# Patient Record
Sex: FEMALE | Race: WHITE | NOT HISPANIC OR LATINO
[De-identification: names, ages, dates, MRNs, and addresses within clinical notes are randomized per-mention and may not be internally consistent; named-entity substitution may affect disease eponyms.]

---

## 2017-03-28 ENCOUNTER — OTHER (OUTPATIENT)
Age: 57
End: 2017-03-28

## 2017-05-10 ENCOUNTER — LABORATORY RESULT (OUTPATIENT)
Age: 57
End: 2017-05-10

## 2017-05-10 ENCOUNTER — APPOINTMENT (OUTPATIENT)
Dept: HEMATOLOGY ONCOLOGY | Facility: CLINIC | Age: 57
End: 2017-05-10

## 2017-05-10 VITALS
BODY MASS INDEX: 29.81 KG/M2 | HEIGHT: 62 IN | SYSTOLIC BLOOD PRESSURE: 146 MMHG | HEART RATE: 63 BPM | DIASTOLIC BLOOD PRESSURE: 83 MMHG | TEMPERATURE: 98.3 F | WEIGHT: 162 LBS

## 2017-05-10 LAB
ALBUMIN SERPL ELPH-MCNC: 4.2 G/DL
ALP BLD-CCNC: 43 U/L
ALT SERPL-CCNC: 6 U/L
ANION GAP SERPL CALC-SCNC: 18 MMOL/L
AST SERPL-CCNC: 11 U/L
BILIRUB SERPL-MCNC: 0.2 MG/DL
BUN SERPL-MCNC: 19 MG/DL
CALCIUM SERPL-MCNC: 9.8 MG/DL
CHLORIDE SERPL-SCNC: 104 MMOL/L
CO2 SERPL-SCNC: 21 MMOL/L
CREAT SERPL-MCNC: 0.83 MG/DL
GLUCOSE SERPL-MCNC: 93 MG/DL
HCT VFR BLD CALC: 36.2 %
HGB BLD-MCNC: 11.7 G/DL
MCHC RBC-ENTMCNC: 28.4 PG
MCHC RBC-ENTMCNC: 32.2 GM/DL
MCV RBC AUTO: 88.3 FL
PLATELET # BLD AUTO: 156 K/UL
POTASSIUM SERPL-SCNC: 4.4 MMOL/L
PROT SERPL-MCNC: 6.3 G/DL
RBC # BLD: 4.1 M/UL
RBC # FLD: 13.4 %
SODIUM SERPL-SCNC: 143 MMOL/L
WBC # FLD AUTO: 6.1 K/UL

## 2017-05-11 LAB — CANCER AG27-29 SERPL-ACNC: 14.9 U/ML

## 2017-05-17 ENCOUNTER — APPOINTMENT (OUTPATIENT)
Dept: HEMATOLOGY ONCOLOGY | Facility: CLINIC | Age: 57
End: 2017-05-17

## 2017-05-17 VITALS
DIASTOLIC BLOOD PRESSURE: 84 MMHG | SYSTOLIC BLOOD PRESSURE: 133 MMHG | HEIGHT: 62 IN | BODY MASS INDEX: 29.81 KG/M2 | WEIGHT: 162 LBS | TEMPERATURE: 98.4 F | HEART RATE: 66 BPM

## 2017-06-01 ENCOUNTER — FORM ENCOUNTER (OUTPATIENT)
Age: 57
End: 2017-06-01

## 2017-06-02 ENCOUNTER — APPOINTMENT (OUTPATIENT)
Dept: MRI IMAGING | Facility: CLINIC | Age: 57
End: 2017-06-02

## 2017-06-02 ENCOUNTER — OUTPATIENT (OUTPATIENT)
Dept: OUTPATIENT SERVICES | Facility: HOSPITAL | Age: 57
LOS: 1 days | End: 2017-06-02
Payer: COMMERCIAL

## 2017-06-02 DIAGNOSIS — Z00.8 ENCOUNTER FOR OTHER GENERAL EXAMINATION: ICD-10-CM

## 2017-06-02 PROCEDURE — C8908: CPT

## 2017-06-02 PROCEDURE — A9585: CPT

## 2017-06-02 PROCEDURE — C8937: CPT

## 2017-06-05 ENCOUNTER — APPOINTMENT (OUTPATIENT)
Dept: MRI IMAGING | Facility: CLINIC | Age: 57
End: 2017-06-05

## 2017-06-20 ENCOUNTER — APPOINTMENT (OUTPATIENT)
Dept: BREAST CENTER | Facility: CLINIC | Age: 57
End: 2017-06-20

## 2017-06-20 VITALS
DIASTOLIC BLOOD PRESSURE: 74 MMHG | HEIGHT: 62 IN | WEIGHT: 156 LBS | SYSTOLIC BLOOD PRESSURE: 110 MMHG | BODY MASS INDEX: 28.71 KG/M2 | HEART RATE: 70 BPM

## 2017-11-14 ENCOUNTER — LABORATORY RESULT (OUTPATIENT)
Age: 57
End: 2017-11-14

## 2017-11-14 ENCOUNTER — APPOINTMENT (OUTPATIENT)
Dept: HEMATOLOGY ONCOLOGY | Facility: CLINIC | Age: 57
End: 2017-11-14
Payer: COMMERCIAL

## 2017-11-14 VITALS — TEMPERATURE: 98.4 F | HEART RATE: 63 BPM | DIASTOLIC BLOOD PRESSURE: 81 MMHG | SYSTOLIC BLOOD PRESSURE: 129 MMHG

## 2017-11-14 LAB
HCT VFR BLD CALC: 38 %
HGB BLD-MCNC: 12.2 G/DL
MCHC RBC-ENTMCNC: 28.7 PG
MCHC RBC-ENTMCNC: 32 GM/DL
MCV RBC AUTO: 89.8 FL
PLATELET # BLD AUTO: 175 K/UL
RBC # BLD: 4.23 M/UL
RBC # FLD: 12.6 %
WBC # FLD AUTO: 4.8 K/UL

## 2017-11-14 PROCEDURE — 85025 COMPLETE CBC W/AUTO DIFF WBC: CPT

## 2017-11-14 PROCEDURE — 36415 COLL VENOUS BLD VENIPUNCTURE: CPT

## 2017-11-15 LAB
25(OH)D3 SERPL-MCNC: 38.6 NG/ML
ALBUMIN SERPL ELPH-MCNC: 4 G/DL
ALP BLD-CCNC: 44 U/L
ALT SERPL-CCNC: 9 U/L
ANION GAP SERPL CALC-SCNC: 15 MMOL/L
AST SERPL-CCNC: 13 U/L
BILIRUB SERPL-MCNC: <0.2 MG/DL
BUN SERPL-MCNC: 14 MG/DL
CALCIUM SERPL-MCNC: 9.8 MG/DL
CHLORIDE SERPL-SCNC: 105 MMOL/L
CO2 SERPL-SCNC: 24 MMOL/L
CREAT SERPL-MCNC: 0.78 MG/DL
GLUCOSE SERPL-MCNC: 90 MG/DL
POTASSIUM SERPL-SCNC: 4.4 MMOL/L
PROT SERPL-MCNC: 6.6 G/DL
SODIUM SERPL-SCNC: 144 MMOL/L

## 2017-11-27 ENCOUNTER — APPOINTMENT (OUTPATIENT)
Dept: HEMATOLOGY ONCOLOGY | Facility: CLINIC | Age: 57
End: 2017-11-27
Payer: COMMERCIAL

## 2017-11-27 VITALS
SYSTOLIC BLOOD PRESSURE: 133 MMHG | HEART RATE: 74 BPM | DIASTOLIC BLOOD PRESSURE: 84 MMHG | HEIGHT: 62 IN | WEIGHT: 168.13 LBS | TEMPERATURE: 98.5 F | BODY MASS INDEX: 30.94 KG/M2

## 2017-11-27 DIAGNOSIS — M35.00 SICCA SYNDROME, UNSPECIFIED: ICD-10-CM

## 2017-11-27 PROCEDURE — 99213 OFFICE O/P EST LOW 20 MIN: CPT

## 2017-12-13 ENCOUNTER — RECORD ABSTRACTING (OUTPATIENT)
Age: 57
End: 2017-12-13

## 2017-12-13 DIAGNOSIS — K27.9 PEPTIC ULCER, SITE UNSPECIFIED, UNSPECIFIED AS ACUTE OR CHRONIC, W/OUT HEMORRHAGE OR PERFORATION: ICD-10-CM

## 2017-12-26 ENCOUNTER — FORM ENCOUNTER (OUTPATIENT)
Age: 57
End: 2017-12-26

## 2017-12-27 ENCOUNTER — OUTPATIENT (OUTPATIENT)
Dept: OUTPATIENT SERVICES | Facility: HOSPITAL | Age: 57
LOS: 1 days | End: 2017-12-27
Payer: COMMERCIAL

## 2017-12-27 ENCOUNTER — APPOINTMENT (OUTPATIENT)
Dept: MAMMOGRAPHY | Facility: CLINIC | Age: 57
End: 2017-12-27
Payer: COMMERCIAL

## 2017-12-27 ENCOUNTER — APPOINTMENT (OUTPATIENT)
Dept: ULTRASOUND IMAGING | Facility: CLINIC | Age: 57
End: 2017-12-27
Payer: COMMERCIAL

## 2017-12-27 DIAGNOSIS — Z00.8 ENCOUNTER FOR OTHER GENERAL EXAMINATION: ICD-10-CM

## 2017-12-27 PROCEDURE — 76641 ULTRASOUND BREAST COMPLETE: CPT | Mod: 26,50

## 2017-12-27 PROCEDURE — 77066 DX MAMMO INCL CAD BI: CPT

## 2017-12-27 PROCEDURE — G0204: CPT | Mod: 26

## 2017-12-27 PROCEDURE — G0279: CPT | Mod: 26

## 2017-12-27 PROCEDURE — G0279: CPT

## 2017-12-27 PROCEDURE — 76641 ULTRASOUND BREAST COMPLETE: CPT

## 2018-01-11 ENCOUNTER — FORM ENCOUNTER (OUTPATIENT)
Age: 58
End: 2018-01-11

## 2018-01-12 ENCOUNTER — OUTPATIENT (OUTPATIENT)
Dept: OUTPATIENT SERVICES | Facility: HOSPITAL | Age: 58
LOS: 1 days | End: 2018-01-12
Payer: COMMERCIAL

## 2018-01-12 ENCOUNTER — RESULT REVIEW (OUTPATIENT)
Age: 58
End: 2018-01-12

## 2018-01-12 ENCOUNTER — APPOINTMENT (OUTPATIENT)
Dept: MAMMOGRAPHY | Facility: CLINIC | Age: 58
End: 2018-01-12
Payer: COMMERCIAL

## 2018-01-12 DIAGNOSIS — Z00.8 ENCOUNTER FOR OTHER GENERAL EXAMINATION: ICD-10-CM

## 2018-01-12 PROCEDURE — 77065 DX MAMMO INCL CAD UNI: CPT

## 2018-01-12 PROCEDURE — 88342 IMHCHEM/IMCYTCHM 1ST ANTB: CPT | Mod: 26

## 2018-01-12 PROCEDURE — 88305 TISSUE EXAM BY PATHOLOGIST: CPT | Mod: 26

## 2018-01-12 PROCEDURE — 77065 DX MAMMO INCL CAD UNI: CPT | Mod: 26,LT

## 2018-01-12 PROCEDURE — 19081 BX BREAST 1ST LESION STRTCTC: CPT | Mod: LT

## 2018-01-12 PROCEDURE — A4648: CPT

## 2018-01-12 PROCEDURE — 19081 BX BREAST 1ST LESION STRTCTC: CPT

## 2018-01-23 ENCOUNTER — APPOINTMENT (OUTPATIENT)
Dept: BREAST CENTER | Facility: CLINIC | Age: 58
End: 2018-01-23
Payer: COMMERCIAL

## 2018-01-23 VITALS
BODY MASS INDEX: 30.91 KG/M2 | WEIGHT: 168 LBS | DIASTOLIC BLOOD PRESSURE: 76 MMHG | SYSTOLIC BLOOD PRESSURE: 110 MMHG | HEART RATE: 80 BPM | HEIGHT: 62 IN

## 2018-01-23 PROCEDURE — 99214 OFFICE O/P EST MOD 30 MIN: CPT

## 2018-03-03 ENCOUNTER — APPOINTMENT (OUTPATIENT)
Dept: MRI IMAGING | Facility: CLINIC | Age: 58
End: 2018-03-03
Payer: COMMERCIAL

## 2018-03-03 ENCOUNTER — OUTPATIENT (OUTPATIENT)
Dept: OUTPATIENT SERVICES | Facility: HOSPITAL | Age: 58
LOS: 1 days | End: 2018-03-03
Payer: COMMERCIAL

## 2018-03-03 DIAGNOSIS — Z00.8 ENCOUNTER FOR OTHER GENERAL EXAMINATION: ICD-10-CM

## 2018-03-03 PROCEDURE — 73718 MRI LOWER EXTREMITY W/O DYE: CPT

## 2018-03-03 PROCEDURE — 73718 MRI LOWER EXTREMITY W/O DYE: CPT | Mod: 26,RT

## 2018-05-15 ENCOUNTER — APPOINTMENT (OUTPATIENT)
Dept: HEMATOLOGY ONCOLOGY | Facility: CLINIC | Age: 58
End: 2018-05-15
Payer: COMMERCIAL

## 2018-05-15 ENCOUNTER — LABORATORY RESULT (OUTPATIENT)
Age: 58
End: 2018-05-15

## 2018-05-15 VITALS
HEART RATE: 71 BPM | TEMPERATURE: 98.9 F | HEIGHT: 62 IN | DIASTOLIC BLOOD PRESSURE: 81 MMHG | WEIGHT: 171.5 LBS | SYSTOLIC BLOOD PRESSURE: 135 MMHG | BODY MASS INDEX: 31.56 KG/M2

## 2018-05-15 LAB
HCT VFR BLD CALC: 37.7 %
HGB BLD-MCNC: 12.6 G/DL
MCHC RBC-ENTMCNC: 29.1 PG
MCHC RBC-ENTMCNC: 33.3 GM/DL
MCV RBC AUTO: 87.2 FL
PLATELET # BLD AUTO: 172 K/UL
RBC # BLD: 4.33 M/UL
RBC # FLD: 12.7 %
WBC # FLD AUTO: 5.2 K/UL

## 2018-05-15 PROCEDURE — 85025 COMPLETE CBC W/AUTO DIFF WBC: CPT

## 2018-05-15 PROCEDURE — 36415 COLL VENOUS BLD VENIPUNCTURE: CPT

## 2018-05-16 LAB
ALBUMIN SERPL ELPH-MCNC: 4.2 G/DL
ALP BLD-CCNC: 51 U/L
ALT SERPL-CCNC: 8 U/L
ANION GAP SERPL CALC-SCNC: 12 MMOL/L
AST SERPL-CCNC: 13 U/L
BILIRUB SERPL-MCNC: 0.2 MG/DL
BUN SERPL-MCNC: 20 MG/DL
CALCIUM SERPL-MCNC: 9.9 MG/DL
CANCER AG27-29 SERPL-ACNC: 15 U/ML
CHLORIDE SERPL-SCNC: 108 MMOL/L
CO2 SERPL-SCNC: 24 MMOL/L
CREAT SERPL-MCNC: 0.81 MG/DL
GLUCOSE SERPL-MCNC: 94 MG/DL
POTASSIUM SERPL-SCNC: 4.2 MMOL/L
PROT SERPL-MCNC: 6.6 G/DL
SODIUM SERPL-SCNC: 144 MMOL/L

## 2018-05-19 PROBLEM — Z83.71 FAMILY HISTORY OF COLONIC POLYPS: Status: ACTIVE | Noted: 2017-12-13

## 2018-05-22 ENCOUNTER — LABORATORY RESULT (OUTPATIENT)
Age: 58
End: 2018-05-22

## 2018-05-22 ENCOUNTER — APPOINTMENT (OUTPATIENT)
Dept: HEMATOLOGY ONCOLOGY | Facility: CLINIC | Age: 58
End: 2018-05-22
Payer: COMMERCIAL

## 2018-05-22 ENCOUNTER — APPOINTMENT (OUTPATIENT)
Dept: GASTROENTEROLOGY | Facility: CLINIC | Age: 58
End: 2018-05-22
Payer: COMMERCIAL

## 2018-05-22 VITALS
WEIGHT: 171.13 LBS | SYSTOLIC BLOOD PRESSURE: 133 MMHG | BODY MASS INDEX: 31.49 KG/M2 | HEART RATE: 66 BPM | HEIGHT: 62 IN | TEMPERATURE: 98.4 F | DIASTOLIC BLOOD PRESSURE: 85 MMHG

## 2018-05-22 VITALS
HEART RATE: 63 BPM | BODY MASS INDEX: 30.91 KG/M2 | HEIGHT: 62 IN | WEIGHT: 168 LBS | DIASTOLIC BLOOD PRESSURE: 89 MMHG | SYSTOLIC BLOOD PRESSURE: 129 MMHG

## 2018-05-22 DIAGNOSIS — Z83.71 FAMILY HISTORY OF COLONIC POLYPS: ICD-10-CM

## 2018-05-22 DIAGNOSIS — Z12.11 ENCOUNTER FOR SCREENING FOR MALIGNANT NEOPLASM OF COLON: ICD-10-CM

## 2018-05-22 LAB
ALBUMIN SERPL ELPH-MCNC: 4.2 G/DL
ALP BLD-CCNC: 56 U/L
ALT SERPL-CCNC: 7 U/L
ANION GAP SERPL CALC-SCNC: 13 MMOL/L
AST SERPL-CCNC: 12 U/L
BILIRUB SERPL-MCNC: 0.2 MG/DL
BUN SERPL-MCNC: 15 MG/DL
CALCIUM SERPL-MCNC: 10.1 MG/DL
CHLORIDE SERPL-SCNC: 105 MMOL/L
CO2 SERPL-SCNC: 25 MMOL/L
CREAT SERPL-MCNC: 0.76 MG/DL
GLUCOSE SERPL-MCNC: 99 MG/DL
HCT VFR BLD CALC: 38.3 %
HGB BLD-MCNC: 12.3 G/DL
MCHC RBC-ENTMCNC: 27.8 PG
MCHC RBC-ENTMCNC: 32.1 GM/DL
MCV RBC AUTO: 86.8 FL
PLATELET # BLD AUTO: 171 K/UL
POTASSIUM SERPL-SCNC: 4.2 MMOL/L
PROT SERPL-MCNC: 6.7 G/DL
RBC # BLD: 4.41 M/UL
RBC # FLD: 12.9 %
SODIUM SERPL-SCNC: 143 MMOL/L
WBC # FLD AUTO: 4.8 K/UL

## 2018-05-22 PROCEDURE — 99214 OFFICE O/P EST MOD 30 MIN: CPT

## 2018-05-22 PROCEDURE — 82274 ASSAY TEST FOR BLOOD FECAL: CPT | Mod: QW

## 2018-05-22 PROCEDURE — 36415 COLL VENOUS BLD VENIPUNCTURE: CPT

## 2018-05-22 PROCEDURE — 85025 COMPLETE CBC W/AUTO DIFF WBC: CPT

## 2018-05-22 PROCEDURE — 99214 OFFICE O/P EST MOD 30 MIN: CPT | Mod: 25

## 2018-05-22 RX ORDER — AMOXICILLIN 875 MG/1
875 TABLET, FILM COATED ORAL
Qty: 20 | Refills: 0 | Status: DISCONTINUED | COMMUNITY
Start: 2018-01-11

## 2018-05-22 NOTE — HISTORY OF PRESENT ILLNESS
[FreeTextEntry1] : Jessica returned to the office today for a routine visit. She has had no change to her bowel habits or abdominal pain. She had blood in her stool for one day, and she was straining at stool prior to see blood. She assumed that this was coming from her hemorrhoids. She is due for a follow-up colonoscopy. In December 2013, she had a left breast lumpectomy followed by radiation. She was diagnosed as stage II A ER+, HER+, and she is on Tamoxifen.

## 2018-05-22 NOTE — CONSULT LETTER
[Dear  ___] : Dear  [unfilled], [Consult Letter:] : I had the pleasure of evaluating your patient, [unfilled]. [( Thank you for referring [unfilled] for consultation for _____ )] : Thank you for referring [unfilled] for consultation for [unfilled] [Please see my note below.] : Please see my note below. [Consult Closing:] : Thank you very much for allowing me to participate in the care of this patient.  If you have any questions, please do not hesitate to contact me. [Sincerely,] : Sincerely, [FreeTextEntry3] : Syed Barlow MD

## 2018-05-22 NOTE — PHYSICAL EXAM
[General Appearance - Alert] : alert [General Appearance - In No Acute Distress] : in no acute distress [General Appearance - Well Nourished] : well nourished [General Appearance - Well Developed] : well developed [General Appearance - Well-Appearing] : healthy appearing [Sclera] : the sclera and conjunctiva were normal [PERRL With Normal Accommodation] : pupils were equal in size, round, and reactive to light [Extraocular Movements] : extraocular movements were intact [Strabismus] : no strabismus was seen [Outer Ear] : the ears and nose were normal in appearance [Examination Of The Oral Cavity] : the lips and gums were normal [Both Tympanic Membranes Were Examined] : both tympanic membranes were normal [Nasal Cavity] : the nasal mucosa and septum were normal [Oropharynx] : the oropharynx was normal [Neck Appearance] : the appearance of the neck was normal [Neck Cervical Mass (___cm)] : no neck mass was observed [Jugular Venous Distention Increased] : there was no jugular-venous distention [Thyroid Diffuse Enlargement] : the thyroid was not enlarged [Thyroid Nodule] : there were no palpable thyroid nodules [Auscultation Breath Sounds / Voice Sounds] : lungs were clear to auscultation bilaterally [Lungs Percussion] : the lungs were normal to percussion [Heart Rate And Rhythm] : heart rate was normal and rhythm regular [Heart Sounds] : normal S1 and S2 [Heart Sounds Gallop] : no gallops [Murmurs] : no murmurs [Heart Sounds Pericardial Friction Rub] : no pericardial rub [Arterial Pulses Carotid] : carotid pulses were normal with no bruits [Abdominal Aorta] : the abdominal aorta was normal [Full Pulse] : the pedal pulses are present [Edema] : there was no peripheral edema [Veins - Varicosity Changes] : there were no varicosital changes [Bowel Sounds] : normal bowel sounds [Abdomen Soft] : soft [Abdomen Tenderness] : non-tender [Abdomen Mass (___ Cm)] : no abdominal mass palpated [Abdomen Hernia] : no hernia was discovered [Normal Sphincter Tone] : normal sphincter tone [No Rectal Mass] : no rectal mass [Occult Blood Positive] : stool was negative for occult blood [FreeTextEntry1] : Brown stool, Hemoccult negative, iFOBT negative [Cervical Lymph Nodes Enlarged Posterior Bilaterally] : posterior cervical [Cervical Lymph Nodes Enlarged Anterior Bilaterally] : anterior cervical [Supraclavicular Lymph Nodes Enlarged Bilaterally] : supraclavicular [Axillary Lymph Nodes Enlarged Bilaterally] : axillary [Femoral Lymph Nodes Enlarged Bilaterally] : femoral [Inguinal Lymph Nodes Enlarged Bilaterally] : inguinal [No CVA Tenderness] : no ~M costovertebral angle tenderness [No Spinal Tenderness] : no spinal tenderness [Abnormal Walk] : normal gait [Nail Clubbing] : no clubbing  or cyanosis of the fingernails [Involuntary Movements] : no involuntary movements were seen [Musculoskeletal - Swelling] : no joint swelling seen [Skin Color & Pigmentation] : normal skin color and pigmentation [Skin Turgor] : normal skin turgor [] : no rash [No Focal Deficits] : no focal deficits [Oriented To Time, Place, And Person] : oriented to person, place, and time [Impaired Insight] : insight and judgment were intact [Affect] : the affect was normal [Mood] : the mood was normal

## 2018-05-22 NOTE — ASSESSMENT
[FreeTextEntry1] : 1. Average risk for colorectal cancer-rule out colonic polyps.\par 2. Status post left breast lumpectomy for stage II A breast cancer.\par 3. History of peptic ulcer disease.\par 4. Status post C-sections.\par \par The patient was advised to schedule a colonoscopy-procedure, material risks, benefits and alternatives were discussed with the patient at length. Brochure given.  MiraLax prep.

## 2018-05-22 NOTE — REVIEW OF SYSTEMS
[Recent Weight Gain (___ Lbs)] : recent [unfilled] ~Ulb weight gain [Joint Pain] : joint pain [Joint Stiffness] : joint stiffness [Negative] : Heme/Lymph

## 2018-05-22 NOTE — REASON FOR VISIT
[Follow-Up: _____] : a [unfilled] follow-up visit [FreeTextEntry1] : Pre-colonoscopic exam.  Pt's last CO was 5 years ago, which showed hemorrhoids.  She currently has no complaints.

## 2018-05-24 LAB — CANCER AG27-29 SERPL-ACNC: 14.2 U/ML

## 2018-06-11 ENCOUNTER — APPOINTMENT (OUTPATIENT)
Dept: GASTROENTEROLOGY | Facility: CLINIC | Age: 58
End: 2018-06-11
Payer: COMMERCIAL

## 2018-06-11 PROCEDURE — G0121 COLON CA SCRN NOT HI RSK IND: CPT

## 2018-07-29 ENCOUNTER — FORM ENCOUNTER (OUTPATIENT)
Age: 58
End: 2018-07-29

## 2018-07-30 ENCOUNTER — OUTPATIENT (OUTPATIENT)
Dept: OUTPATIENT SERVICES | Facility: HOSPITAL | Age: 58
LOS: 1 days | End: 2018-07-30
Payer: COMMERCIAL

## 2018-07-30 ENCOUNTER — APPOINTMENT (OUTPATIENT)
Dept: MAMMOGRAPHY | Facility: CLINIC | Age: 58
End: 2018-07-30
Payer: COMMERCIAL

## 2018-07-30 DIAGNOSIS — Z00.8 ENCOUNTER FOR OTHER GENERAL EXAMINATION: ICD-10-CM

## 2018-07-30 PROCEDURE — 77065 DX MAMMO INCL CAD UNI: CPT

## 2018-07-30 PROCEDURE — G0279: CPT

## 2018-07-30 PROCEDURE — 77065 DX MAMMO INCL CAD UNI: CPT | Mod: 26,LT

## 2018-07-30 PROCEDURE — G0279: CPT | Mod: 26

## 2018-09-25 ENCOUNTER — APPOINTMENT (OUTPATIENT)
Dept: BREAST CENTER | Facility: CLINIC | Age: 58
End: 2018-09-25
Payer: COMMERCIAL

## 2018-09-25 VITALS
WEIGHT: 170 LBS | BODY MASS INDEX: 31.28 KG/M2 | HEIGHT: 62 IN | DIASTOLIC BLOOD PRESSURE: 82 MMHG | SYSTOLIC BLOOD PRESSURE: 138 MMHG

## 2018-09-25 PROCEDURE — 99213 OFFICE O/P EST LOW 20 MIN: CPT

## 2018-11-20 ENCOUNTER — LABORATORY RESULT (OUTPATIENT)
Age: 58
End: 2018-11-20

## 2018-11-20 ENCOUNTER — APPOINTMENT (OUTPATIENT)
Dept: HEMATOLOGY ONCOLOGY | Facility: CLINIC | Age: 58
End: 2018-11-20
Payer: COMMERCIAL

## 2018-11-20 VITALS
HEIGHT: 62 IN | DIASTOLIC BLOOD PRESSURE: 88 MMHG | SYSTOLIC BLOOD PRESSURE: 150 MMHG | BODY MASS INDEX: 32.09 KG/M2 | WEIGHT: 174.38 LBS | HEART RATE: 64 BPM | TEMPERATURE: 98.5 F

## 2018-11-20 LAB
ALBUMIN SERPL ELPH-MCNC: 4.4 G/DL
ALP BLD-CCNC: 55 U/L
ALT SERPL-CCNC: 11 U/L
ANION GAP SERPL CALC-SCNC: 10 MMOL/L
AST SERPL-CCNC: 13 U/L
BILIRUB SERPL-MCNC: 0.2 MG/DL
BUN SERPL-MCNC: 18 MG/DL
CALCIUM SERPL-MCNC: 10.2 MG/DL
CHLORIDE SERPL-SCNC: 104 MMOL/L
CO2 SERPL-SCNC: 28 MMOL/L
CREAT SERPL-MCNC: 0.77 MG/DL
GLUCOSE SERPL-MCNC: 94 MG/DL
HCT VFR BLD CALC: 39.3 %
HGB BLD-MCNC: 12.5 G/DL
MCHC RBC-ENTMCNC: 28 PG
MCHC RBC-ENTMCNC: 31.8 GM/DL
MCV RBC AUTO: 88.1 FL
PLATELET # BLD AUTO: 176 K/UL
POTASSIUM SERPL-SCNC: 4.2 MMOL/L
PROT SERPL-MCNC: 6.6 G/DL
RBC # BLD: 4.46 M/UL
RBC # FLD: 13.3 %
SODIUM SERPL-SCNC: 142 MMOL/L
WBC # FLD AUTO: 5.4 K/UL

## 2018-11-20 PROCEDURE — 36415 COLL VENOUS BLD VENIPUNCTURE: CPT

## 2018-11-20 PROCEDURE — 85025 COMPLETE CBC W/AUTO DIFF WBC: CPT

## 2018-11-21 LAB — CANCER AG27-29 SERPL-ACNC: 17.5 U/ML

## 2018-11-27 ENCOUNTER — APPOINTMENT (OUTPATIENT)
Dept: HEMATOLOGY ONCOLOGY | Facility: CLINIC | Age: 58
End: 2018-11-27
Payer: COMMERCIAL

## 2018-11-27 VITALS
WEIGHT: 172 LBS | BODY MASS INDEX: 31.65 KG/M2 | DIASTOLIC BLOOD PRESSURE: 86 MMHG | SYSTOLIC BLOOD PRESSURE: 135 MMHG | TEMPERATURE: 98.5 F | HEART RATE: 62 BPM | HEIGHT: 62 IN

## 2018-11-27 DIAGNOSIS — Z51.81 ENCOUNTER FOR THERAPEUTIC DRUG LVL MONITORING: ICD-10-CM

## 2018-11-27 DIAGNOSIS — Z79.899 ENCOUNTER FOR THERAPEUTIC DRUG LVL MONITORING: ICD-10-CM

## 2018-11-27 PROCEDURE — 99214 OFFICE O/P EST MOD 30 MIN: CPT

## 2018-11-27 NOTE — HISTORY OF PRESENT ILLNESS
[T: ___] : T[unfilled] [N: ___] : N[unfilled] [AJCC Stage: ____] : AJCC Stage: [unfilled] [0 - No Distress] : Distress Level: 0 [de-identified] : We are following FAITH TOVAR for an ER+, HER2-, stage IIA breast cancer. \par 8/2014 - Presented with an abnormality on routine mammogram ( there was an irregularity in the same region of a previous stereotactic biopsy in the UOQ of the left breast) at age 54.  \par 11/5/14 -  core biopsy revealed cancer. \par 12/10/14 - left lumpectomy for a 1.8 x 1.6 x 1.0 IDC with focal DCIS and there was lymphovascular invasion present.  1/1  SLN was positive with a 2.5mm micrometastasis.   \par 4/13/15 - Completed XRT.    \par 2/10/15 - Started Tamoxifen (she was still premenopausal). [de-identified] : infiltrating ductal carcinoma [de-identified] :  ER 85%, [place] 95%, HER2 -;  Oncotype score was 15 (low risk) [de-identified] :   Patient is feeling well. Patient has right  heel pain been followed by Rheumatologist. \par \par She denies any other changes in her family, medical, or social history since her last visit of 5/22/18.

## 2018-11-27 NOTE — REVIEW OF SYSTEMS
[Patient Intake Form Reviewed] : Patient intake form was reviewed [Joint Pain] : joint pain [Muscle Weakness] : muscle weakness [Negative] : Allergic/Immunologic [FreeTextEntry2] : Right foot pains as in interval history.  Just taking Aleve PRN. [FreeTextEntry5] : occasional LE edema but mostly only when it is very hot outside [FreeTextEntry9] : right foot, occasional numbness in toes (not neuropathy per neurologist) [de-identified] : LMP 4/1/16

## 2018-11-27 NOTE — PHYSICAL EXAM
[Restricted in physically strenuous activity but ambulatory and able to carry out work of a light or sedentary nature] : Status 1- Restricted in physically strenuous activity but ambulatory and able to carry out work of a light or sedentary nature, e.g., light house work, office work [Normal] : affect appropriate [de-identified] : overweight [de-identified] : anicteric [de-identified] : no lymphadenopathy [de-identified] : S1 S2 RRR, no obvious murmurs [de-identified] : no c/c/e [de-identified] : Bilateral breast and axillas  no masses palpated, no nipple discharge.  [de-identified] : no rashes

## 2018-12-19 ENCOUNTER — RX RENEWAL (OUTPATIENT)
Age: 58
End: 2018-12-19

## 2019-03-05 ENCOUNTER — FORM ENCOUNTER (OUTPATIENT)
Age: 59
End: 2019-03-05

## 2019-03-06 ENCOUNTER — APPOINTMENT (OUTPATIENT)
Dept: MAMMOGRAPHY | Facility: CLINIC | Age: 59
End: 2019-03-06
Payer: COMMERCIAL

## 2019-03-06 ENCOUNTER — APPOINTMENT (OUTPATIENT)
Dept: ULTRASOUND IMAGING | Facility: CLINIC | Age: 59
End: 2019-03-06
Payer: COMMERCIAL

## 2019-03-06 ENCOUNTER — OUTPATIENT (OUTPATIENT)
Dept: OUTPATIENT SERVICES | Facility: HOSPITAL | Age: 59
LOS: 1 days | End: 2019-03-06
Payer: COMMERCIAL

## 2019-03-06 DIAGNOSIS — Z00.8 ENCOUNTER FOR OTHER GENERAL EXAMINATION: ICD-10-CM

## 2019-03-06 PROCEDURE — 77066 DX MAMMO INCL CAD BI: CPT | Mod: 26

## 2019-03-06 PROCEDURE — G0279: CPT | Mod: 26

## 2019-03-06 PROCEDURE — 76641 ULTRASOUND BREAST COMPLETE: CPT | Mod: 26,50

## 2019-03-06 PROCEDURE — 76641 ULTRASOUND BREAST COMPLETE: CPT

## 2019-03-06 PROCEDURE — G0279: CPT

## 2019-03-06 PROCEDURE — 77066 DX MAMMO INCL CAD BI: CPT

## 2019-05-06 NOTE — PAST MEDICAL HISTORY
[Menarche Age ____] : age at menarche was [unfilled] [Definite ___ (Date)] : the last menstrual period was [unfilled] [Approximately ___] : the LMP was approximately [unfilled] [Total Preg ___] : G[unfilled] [Live Births ___] : P[unfilled]  [Age At Live Birth ___] : Age at live birth: [unfilled]

## 2019-05-07 ENCOUNTER — APPOINTMENT (OUTPATIENT)
Dept: BREAST CENTER | Facility: CLINIC | Age: 59
End: 2019-05-07
Payer: COMMERCIAL

## 2019-05-07 VITALS
HEIGHT: 62 IN | SYSTOLIC BLOOD PRESSURE: 139 MMHG | WEIGHT: 170 LBS | BODY MASS INDEX: 31.28 KG/M2 | DIASTOLIC BLOOD PRESSURE: 82 MMHG | HEART RATE: 57 BPM

## 2019-05-07 PROCEDURE — 99213 OFFICE O/P EST LOW 20 MIN: CPT

## 2019-05-07 NOTE — PHYSICAL EXAM
[Sclera nonicteric] : sclera nonicteric [Conjunctiva pink] : conjunctiva pink [No Supraclavicular Adenopathy] : no supraclavicular adenopathy [Supple] : supple [No Cervical Adenopathy] : no cervical adenopathy [No Thyromegaly] : no thyromegaly [Clear to Auscultation Bilat] : clear to auscultation bilaterally [No Gallops] : no gallops [Normal Sinus Rhythm] : normal sinus rhythm [Examined in the supine and seated position] : examined in the supine and seated position [No Rubs] : no pericardial rub [Symmetrical] : symmetrical [No dominant masses] : no dominant masses in right breast  [No dominant masses] : no dominant masses left breast [No Nipple Retraction] : no left nipple retraction [No Nipple Discharge] : no right nipple discharge [No Axillary Lymphadenopathy] : no left axillary lymphadenopathy [Soft] : abdomen soft [Not Tender] : non-tender [No Hepato-Splenomegaly] : no hepato-splenomegaly [No Swelling] : no swelling [de-identified] : Lumpectomy incision at 12-1:00.  Radiation changes. [de-identified] : axillary incision .

## 2019-05-07 NOTE — DATA REVIEWED
[FreeTextEntry1] :   \par Breast MRI:  06/02/17   Findings: No suspicious findings.\par \par Mammogram/sonogram:  12/27/17    Findings:Dense tissue; distortion left UOQ.  New 4 mm nodular asymmetry medial left breast.   On ultrasound there are no suspicious findings. No correlate to the mammographic finding.\par \par Left stereotactic biopsy:  1/12/18   Pathology:  Leiomyoma left breast.\par \par Left Diagnostic Mammogram:  7/30/18   Findings:  Negative\par \par Bilateral mammogram:  3/6/19   Findings:  No suspicious masses or calcifications.\par Breast ultrasound: 3/6/19   findings:  Stable, biopsy proven benign 5 mm nodule Right breast 1:00 N12.\par Left breast - negative.\par \par

## 2019-05-07 NOTE — HISTORY OF PRESENT ILLNESS
[FreeTextEntry1] : 58 year old female with a history of stage 2A left breast carcinoma presents for a surveillance examination.   She has no complaints.

## 2019-06-20 ENCOUNTER — LABORATORY RESULT (OUTPATIENT)
Age: 59
End: 2019-06-20

## 2019-06-20 ENCOUNTER — APPOINTMENT (OUTPATIENT)
Age: 59
End: 2019-06-20
Payer: COMMERCIAL

## 2019-06-20 VITALS — TEMPERATURE: 98.7 F | DIASTOLIC BLOOD PRESSURE: 82 MMHG | HEART RATE: 68 BPM | SYSTOLIC BLOOD PRESSURE: 121 MMHG

## 2019-06-20 LAB
ALBUMIN SERPL ELPH-MCNC: 4.3 G/DL
ALP BLD-CCNC: 61 U/L
ALT SERPL-CCNC: 8 U/L
ANION GAP SERPL CALC-SCNC: 10 MMOL/L
AST SERPL-CCNC: 13 U/L
BILIRUB SERPL-MCNC: 0.2 MG/DL
BUN SERPL-MCNC: 18 MG/DL
CALCIUM SERPL-MCNC: 9.4 MG/DL
CHLORIDE SERPL-SCNC: 108 MMOL/L
CO2 SERPL-SCNC: 27 MMOL/L
CREAT SERPL-MCNC: 0.79 MG/DL
GLUCOSE SERPL-MCNC: 104 MG/DL
POTASSIUM SERPL-SCNC: 4.1 MMOL/L
PROT SERPL-MCNC: 6.3 G/DL
SODIUM SERPL-SCNC: 145 MMOL/L

## 2019-06-20 PROCEDURE — 36415 COLL VENOUS BLD VENIPUNCTURE: CPT

## 2019-06-20 PROCEDURE — 85025 COMPLETE CBC W/AUTO DIFF WBC: CPT

## 2019-06-21 LAB
25(OH)D3 SERPL-MCNC: 39.4 NG/ML
CANCER AG27-29 SERPL-ACNC: 17 U/ML

## 2019-06-27 ENCOUNTER — APPOINTMENT (OUTPATIENT)
Age: 59
End: 2019-06-27
Payer: COMMERCIAL

## 2019-06-27 VITALS
HEART RATE: 63 BPM | RESPIRATION RATE: 16 BRPM | BODY MASS INDEX: 31.28 KG/M2 | WEIGHT: 170 LBS | DIASTOLIC BLOOD PRESSURE: 79 MMHG | SYSTOLIC BLOOD PRESSURE: 130 MMHG | TEMPERATURE: 98.4 F | HEIGHT: 62 IN

## 2019-06-27 PROCEDURE — 99214 OFFICE O/P EST MOD 30 MIN: CPT

## 2019-06-27 NOTE — PHYSICAL EXAM
[Fully active, able to carry on all pre-disease performance without restriction] : Status 0 - Fully active, able to carry on all pre-disease performance without restriction [Normal] : full range of motion and no deformities appreciated [de-identified] : left breast lumpectomy scar well healed. No masses, no nipple d/c bilaterally.

## 2019-06-27 NOTE — ASSESSMENT
[FreeTextEntry1] : Ms. TOVAR 's questions were answered to her satisfaction. She expressed her understanding and willingness to comply with the above recommendations, and  will return to the office in 6 months.\par

## 2019-06-27 NOTE — HISTORY OF PRESENT ILLNESS
[de-identified] : 58 F with ER+, Her 2 negative, stage II A ( T1c, N1a)  infiltrating ductal carcinoma diagnosed in December 2014.\par \par CASE SYNOPSIS:\par 8/2014 – mammogram at age 54 –abnormality in left breast UOQ, previously biopsied. \par \par 11/5/14 – core biopsy confirms malignancy .\par \par 12/10/14 – left lumpectomy; pathology 1.8 x 1.6 x 1.0 cm infiltrating ductal carcinoma with focal DCIS, LVI+, 1/1 SLN positive with 2.5 mm micrometastasis.\par \par 2/10/15 – started Tamoxifen (patient premenopausal at the time). \par \par 4/13/15 – completed XRT.\par \par 7/20/18 – mammogram/ sonogram: no evidence of disease.\par \par 3/6/19- mammogram/ breast US: no mammographic evidence of malignancy; stable hypoechoic nodule 1:00 right breast (previously biopsied).\par \par 6/11/19- D&C- endometrial hyperplasia; + endometrial polyps x 3; no malignancy\par  [FreeTextEntry1] : Tamoxifen [de-identified] : Patient returning for follow up; last seen in office in November 2018. In interim patient had annual mammogram in March 2019, with no evidence of malignancy. Denies arthritic pain, VTEs; complaining of gaining weight and irritability. Continues on Tamoxifen.Had D&C 6/11/19 consistent with endometrial hyperplasia, no malignancy. Appetite and weight stable.

## 2019-06-28 ENCOUNTER — APPOINTMENT (OUTPATIENT)
Dept: HEMATOLOGY ONCOLOGY | Facility: CLINIC | Age: 59
End: 2019-06-28

## 2019-11-14 ENCOUNTER — APPOINTMENT (OUTPATIENT)
Dept: BREAST CENTER | Facility: CLINIC | Age: 59
End: 2019-11-14
Payer: COMMERCIAL

## 2019-11-14 VITALS
DIASTOLIC BLOOD PRESSURE: 84 MMHG | HEIGHT: 62 IN | HEART RATE: 61 BPM | WEIGHT: 169 LBS | SYSTOLIC BLOOD PRESSURE: 126 MMHG | BODY MASS INDEX: 31.1 KG/M2

## 2019-11-14 DIAGNOSIS — Z80.3 FAMILY HISTORY OF MALIGNANT NEOPLASM OF BREAST: ICD-10-CM

## 2019-11-14 DIAGNOSIS — Z80.49 FAMILY HISTORY OF MALIGNANT NEOPLASM OF OTHER GENITAL ORGANS: ICD-10-CM

## 2019-11-14 PROCEDURE — 99214 OFFICE O/P EST MOD 30 MIN: CPT

## 2019-11-14 NOTE — PHYSICAL EXAM
[Sclera nonicteric] : sclera nonicteric [Conjunctiva pink] : conjunctiva pink [Supple] : supple [No Cervical Adenopathy] : no cervical adenopathy [No Supraclavicular Adenopathy] : no supraclavicular adenopathy [No Thyromegaly] : no thyromegaly [Clear to Auscultation Bilat] : clear to auscultation bilaterally [Normal Sinus Rhythm] : normal sinus rhythm [No Gallops] : no gallops [No Rubs] : no pericardial rub [Examined in the supine and seated position] : examined in the supine and seated position [Symmetrical] : symmetrical [No dominant masses] : no dominant masses in right breast  [No dominant masses] : no dominant masses left breast [No Nipple Retraction] : no left nipple retraction [No Nipple Discharge] : no left nipple discharge [No Axillary Lymphadenopathy] : no left axillary lymphadenopathy [Soft] : abdomen soft [Not Tender] : non-tender [No Hepato-Splenomegaly] : no hepato-splenomegaly [No Swelling] : no swelling [de-identified] : Lumpectomy incision at 12-1:00.  Radiation changes. [de-identified] : axillary incision .

## 2019-11-14 NOTE — HISTORY OF PRESENT ILLNESS
[FreeTextEntry1] : 59 year old female with a history of stage 2A left breast carcinoma presents for a surveillance examination.   She has no complaints.

## 2019-12-10 ENCOUNTER — RX RENEWAL (OUTPATIENT)
Age: 59
End: 2019-12-10

## 2020-01-03 ENCOUNTER — RESULT REVIEW (OUTPATIENT)
Age: 60
End: 2020-01-03

## 2020-01-03 ENCOUNTER — APPOINTMENT (OUTPATIENT)
Age: 60
End: 2020-01-03
Payer: COMMERCIAL

## 2020-01-03 ENCOUNTER — OUTPATIENT (OUTPATIENT)
Dept: OUTPATIENT SERVICES | Facility: HOSPITAL | Age: 60
LOS: 1 days | Discharge: HOME | End: 2020-01-03

## 2020-01-03 DIAGNOSIS — C50.912 MALIGNANT NEOPLASM OF UNSPECIFIED SITE OF LEFT FEMALE BREAST: ICD-10-CM

## 2020-01-03 PROCEDURE — 99499A: CUSTOM | Mod: NC

## 2020-02-13 ENCOUNTER — OUTPATIENT (OUTPATIENT)
Dept: OUTPATIENT SERVICES | Facility: HOSPITAL | Age: 60
LOS: 1 days | Discharge: HOME | End: 2020-02-13

## 2020-02-13 DIAGNOSIS — C50.912 MALIGNANT NEOPLASM OF UNSPECIFIED SITE OF LEFT FEMALE BREAST: ICD-10-CM

## 2020-02-21 ENCOUNTER — APPOINTMENT (OUTPATIENT)
Age: 60
End: 2020-02-21
Payer: COMMERCIAL

## 2020-02-21 VITALS
RESPIRATION RATE: 16 BRPM | HEART RATE: 58 BPM | SYSTOLIC BLOOD PRESSURE: 153 MMHG | HEIGHT: 62 IN | BODY MASS INDEX: 31.28 KG/M2 | WEIGHT: 170 LBS | TEMPERATURE: 98.5 F | DIASTOLIC BLOOD PRESSURE: 91 MMHG

## 2020-02-21 PROCEDURE — 99214 OFFICE O/P EST MOD 30 MIN: CPT

## 2020-02-21 RX ORDER — AMOXICILLIN AND CLAVULANATE POTASSIUM 875; 125 MG/1; MG/1
875-125 TABLET, COATED ORAL
Qty: 20 | Refills: 0 | Status: DISCONTINUED | COMMUNITY
Start: 2020-01-03

## 2020-02-21 RX ORDER — CEFUROXIME AXETIL 250 MG/1
250 TABLET ORAL
Qty: 20 | Refills: 0 | Status: DISCONTINUED | COMMUNITY
Start: 2019-11-14

## 2020-02-21 RX ORDER — CEFDINIR 300 MG/1
300 CAPSULE ORAL
Qty: 14 | Refills: 0 | Status: DISCONTINUED | COMMUNITY
Start: 2020-01-25

## 2020-02-21 NOTE — HISTORY OF PRESENT ILLNESS
[de-identified] : 59 F with ER+, Her 2 negative, stage II A ( T1c, N1a)  infiltrating ductal carcinoma diagnosed in December 2014.\par \par CASE SYNOPSIS:\par 8/2014 – mammogram at age 54 –abnormality in left breast UOQ, previously biopsied. \par \par 11/5/14 – core biopsy confirms malignancy .\par \par 12/10/14 – left lumpectomy; pathology 1.8 x 1.6 x 1.0 cm infiltrating ductal carcinoma with focal DCIS, LVI+, 1/1 SLN positive with 2.5 mm micrometastasis.\par \par 2/10/15 – started Tamoxifen (patient premenopausal at the time). \par \par 4/13/15 – completed XRT.\par \par 7/20/18 – mammogram/ sonogram: no evidence of disease.\par \par 3/6/19- mammogram/ breast US: no mammographic evidence of malignancy; stable hypoechoic nodule 1:00 right breast (previously biopsied).\par \par 6/11/19- D&C- endometrial hyperplasia; + endometrial polyps x 3; no malignancy.\par \par 1/24/20- D&C; fragments of endometrial polyp in a background of weakly proliferative endometrium.\par \par 3/1/20-Plan to discontinue tamoxifen and switched to letrozole 2.5 mg daily. [FreeTextEntry1] : Tamoxifen-to be discontinued 2/29/20\par Letrozole 2.5 mg daily- to start March 1, 2020. [de-identified] : Last seen in office in June 2019. Ms. TOVAR is doing well, complies with adjuvant hormonal treatment ( Tamoxifen). She endorses minimal side effects. Last mammogram (3 / 6/ 19 consistent with no evidence of malignancy; due for repeat mammogram on March 4, 2020. Laboratory tests in normal range.On January 24, 2020 patient had a D&C consistent fragments of endometrial polyp in a background of weakly proliferative endometrium.She remains active, and plans a trip to [place] in the near future.

## 2020-02-21 NOTE — PHYSICAL EXAM
[Fully active, able to carry on all pre-disease performance without restriction] : Status 0 - Fully active, able to carry on all pre-disease performance without restriction [Normal] : full range of motion and no deformities appreciated [de-identified] : left breast lumpectomy scar well healed. No masses, no nipple d/c bilaterally.

## 2020-03-03 ENCOUNTER — FORM ENCOUNTER (OUTPATIENT)
Age: 60
End: 2020-03-03

## 2020-03-04 ENCOUNTER — APPOINTMENT (OUTPATIENT)
Dept: CARDIOLOGY | Facility: CLINIC | Age: 60
End: 2020-03-04
Payer: COMMERCIAL

## 2020-03-04 ENCOUNTER — APPOINTMENT (OUTPATIENT)
Dept: ULTRASOUND IMAGING | Facility: CLINIC | Age: 60
End: 2020-03-04
Payer: COMMERCIAL

## 2020-03-04 ENCOUNTER — OUTPATIENT (OUTPATIENT)
Dept: OUTPATIENT SERVICES | Facility: HOSPITAL | Age: 60
LOS: 1 days | End: 2020-03-04
Payer: COMMERCIAL

## 2020-03-04 ENCOUNTER — APPOINTMENT (OUTPATIENT)
Dept: MAMMOGRAPHY | Facility: CLINIC | Age: 60
End: 2020-03-04
Payer: COMMERCIAL

## 2020-03-04 VITALS
HEART RATE: 67 BPM | RESPIRATION RATE: 14 BRPM | WEIGHT: 170 LBS | DIASTOLIC BLOOD PRESSURE: 83 MMHG | BODY MASS INDEX: 31.28 KG/M2 | HEIGHT: 62 IN | SYSTOLIC BLOOD PRESSURE: 132 MMHG

## 2020-03-04 DIAGNOSIS — Z85.3 PERSONAL HISTORY OF MALIGNANT NEOPLASM OF BREAST: ICD-10-CM

## 2020-03-04 PROCEDURE — 77066 DX MAMMO INCL CAD BI: CPT | Mod: 26

## 2020-03-04 PROCEDURE — 76641 ULTRASOUND BREAST COMPLETE: CPT | Mod: 26,50

## 2020-03-04 PROCEDURE — G0279: CPT | Mod: 26

## 2020-03-04 PROCEDURE — 77063 BREAST TOMOSYNTHESIS BI: CPT

## 2020-03-04 PROCEDURE — 77067 SCR MAMMO BI INCL CAD: CPT

## 2020-03-04 PROCEDURE — 99244 OFF/OP CNSLTJ NEW/EST MOD 40: CPT

## 2020-03-04 PROCEDURE — G0279: CPT

## 2020-03-04 PROCEDURE — 77066 DX MAMMO INCL CAD BI: CPT

## 2020-03-04 PROCEDURE — 93000 ELECTROCARDIOGRAM COMPLETE: CPT

## 2020-03-04 PROCEDURE — 76641 ULTRASOUND BREAST COMPLETE: CPT

## 2020-03-07 ENCOUNTER — APPOINTMENT (OUTPATIENT)
Dept: ULTRASOUND IMAGING | Facility: CLINIC | Age: 60
End: 2020-03-07

## 2020-03-07 ENCOUNTER — APPOINTMENT (OUTPATIENT)
Dept: MAMMOGRAPHY | Facility: CLINIC | Age: 60
End: 2020-03-07

## 2020-04-16 ENCOUNTER — APPOINTMENT (OUTPATIENT)
Dept: CARDIOLOGY | Facility: CLINIC | Age: 60
End: 2020-04-16

## 2020-06-11 ENCOUNTER — APPOINTMENT (OUTPATIENT)
Dept: CARDIOLOGY | Facility: CLINIC | Age: 60
End: 2020-06-11

## 2020-08-31 ENCOUNTER — OUTPATIENT (OUTPATIENT)
Dept: OUTPATIENT SERVICES | Facility: HOSPITAL | Age: 60
LOS: 1 days | Discharge: HOME | End: 2020-08-31

## 2020-08-31 DIAGNOSIS — C50.912 MALIGNANT NEOPLASM OF UNSPECIFIED SITE OF LEFT FEMALE BREAST: ICD-10-CM

## 2020-09-08 ENCOUNTER — RESULT REVIEW (OUTPATIENT)
Age: 60
End: 2020-09-08

## 2020-09-08 ENCOUNTER — APPOINTMENT (OUTPATIENT)
Age: 60
End: 2020-09-08
Payer: COMMERCIAL

## 2020-09-08 VITALS — TEMPERATURE: 98 F

## 2020-09-08 LAB
24R-OH-CALCIDIOL SERPL-MCNC: 45.3 NG/ML — SIGNIFICANT CHANGE UP (ref 30–80)
ALBUMIN SERPL ELPH-MCNC: 4.5 G/DL — SIGNIFICANT CHANGE UP (ref 3.3–5)
ALP SERPL-CCNC: 74 U/L — SIGNIFICANT CHANGE UP (ref 40–120)
ALT FLD-CCNC: 15 U/L — SIGNIFICANT CHANGE UP (ref 10–45)
ANION GAP SERPL CALC-SCNC: 11 MMOL/L — SIGNIFICANT CHANGE UP (ref 5–17)
AST SERPL-CCNC: 20 U/L — SIGNIFICANT CHANGE UP (ref 10–40)
BASOPHILS # BLD AUTO: 0.03 K/UL — SIGNIFICANT CHANGE UP (ref 0–0.2)
BASOPHILS NFR BLD AUTO: 0.6 % — SIGNIFICANT CHANGE UP (ref 0–2)
BILIRUB SERPL-MCNC: 0.2 MG/DL — SIGNIFICANT CHANGE UP (ref 0.2–1.2)
BUN SERPL-MCNC: 17 MG/DL — SIGNIFICANT CHANGE UP (ref 7–23)
CALCIUM SERPL-MCNC: 10 MG/DL — SIGNIFICANT CHANGE UP (ref 8.4–10.5)
CHLORIDE SERPL-SCNC: 105 MMOL/L — SIGNIFICANT CHANGE UP (ref 96–108)
CO2 SERPL-SCNC: 26 MMOL/L — SIGNIFICANT CHANGE UP (ref 22–31)
CREAT SERPL-MCNC: 0.69 MG/DL — SIGNIFICANT CHANGE UP (ref 0.5–1.3)
EOSINOPHIL # BLD AUTO: 0.18 K/UL — SIGNIFICANT CHANGE UP (ref 0–0.5)
EOSINOPHIL NFR BLD AUTO: 3.5 % — SIGNIFICANT CHANGE UP (ref 0–6)
GLUCOSE SERPL-MCNC: 88 MG/DL — SIGNIFICANT CHANGE UP (ref 70–99)
HCT VFR BLD CALC: 37.5 % — SIGNIFICANT CHANGE UP (ref 34.5–45)
HGB BLD-MCNC: 12.4 G/DL — SIGNIFICANT CHANGE UP (ref 11.5–15.5)
IMM GRANULOCYTES NFR BLD AUTO: 0.6 % — SIGNIFICANT CHANGE UP (ref 0–1.5)
LYMPHOCYTES # BLD AUTO: 1.24 K/UL — SIGNIFICANT CHANGE UP (ref 1–3.3)
LYMPHOCYTES # BLD AUTO: 23.9 % — SIGNIFICANT CHANGE UP (ref 13–44)
MCHC RBC-ENTMCNC: 28.2 PG — SIGNIFICANT CHANGE UP (ref 27–34)
MCHC RBC-ENTMCNC: 33.1 GM/DL — SIGNIFICANT CHANGE UP (ref 32–36)
MCV RBC AUTO: 85.4 FL — SIGNIFICANT CHANGE UP (ref 80–100)
MONOCYTES # BLD AUTO: 0.43 K/UL — SIGNIFICANT CHANGE UP (ref 0–0.9)
MONOCYTES NFR BLD AUTO: 8.3 % — SIGNIFICANT CHANGE UP (ref 2–14)
NEUTROPHILS # BLD AUTO: 3.27 K/UL — SIGNIFICANT CHANGE UP (ref 1.8–7.4)
NEUTROPHILS NFR BLD AUTO: 63.1 % — SIGNIFICANT CHANGE UP (ref 43–77)
NRBC # BLD: 0 /100 WBCS — SIGNIFICANT CHANGE UP (ref 0–0)
PLATELET # BLD AUTO: 194 K/UL — SIGNIFICANT CHANGE UP (ref 150–400)
POTASSIUM SERPL-MCNC: 4.3 MMOL/L — SIGNIFICANT CHANGE UP (ref 3.5–5.3)
POTASSIUM SERPL-SCNC: 4.3 MMOL/L — SIGNIFICANT CHANGE UP (ref 3.5–5.3)
PROT SERPL-MCNC: 6.5 G/DL — SIGNIFICANT CHANGE UP (ref 6–8.3)
RBC # BLD: 4.39 M/UL — SIGNIFICANT CHANGE UP (ref 3.8–5.2)
RBC # FLD: 14.3 % — SIGNIFICANT CHANGE UP (ref 10.3–14.5)
SODIUM SERPL-SCNC: 142 MMOL/L — SIGNIFICANT CHANGE UP (ref 135–145)
WBC # BLD: 5.18 K/UL — SIGNIFICANT CHANGE UP (ref 3.8–10.5)
WBC # FLD AUTO: 5.18 K/UL — SIGNIFICANT CHANGE UP (ref 3.8–10.5)

## 2020-09-08 PROCEDURE — 99499A: CUSTOM | Mod: NC

## 2020-09-09 LAB — CANCER AG27-29 SERPL-ACNC: 19.9 U/ML — SIGNIFICANT CHANGE UP (ref 0–38.6)

## 2020-09-12 DIAGNOSIS — Z87.898 PERSONAL HISTORY OF OTHER SPECIFIED CONDITIONS: ICD-10-CM

## 2020-09-15 ENCOUNTER — APPOINTMENT (OUTPATIENT)
Age: 60
End: 2020-09-15
Payer: COMMERCIAL

## 2020-09-15 VITALS
DIASTOLIC BLOOD PRESSURE: 80 MMHG | SYSTOLIC BLOOD PRESSURE: 122 MMHG | RESPIRATION RATE: 14 BRPM | HEART RATE: 65 BPM | BODY MASS INDEX: 32.02 KG/M2 | HEIGHT: 62 IN | WEIGHT: 174 LBS | TEMPERATURE: 96 F

## 2020-09-15 PROCEDURE — 99214 OFFICE O/P EST MOD 30 MIN: CPT

## 2020-09-15 NOTE — PHYSICAL EXAM
[Fully active, able to carry on all pre-disease performance without restriction] : Status 0 - Fully active, able to carry on all pre-disease performance without restriction [Normal] : full range of motion and no deformities appreciated [de-identified] : left breast lumpectomy scar well healed. No masses, no nipple d/c bilaterally.

## 2020-09-15 NOTE — OB HISTORY
[Menopause Age: ____] : age at menopause was [unfilled] [Currently In Menopause] : currently in menopause [___] :  Induced: [unfilled]

## 2020-09-15 NOTE — HISTORY OF PRESENT ILLNESS
[de-identified] : 60 F, postmenopausal, of [place] origin, with ER+, Her 2 negative, stage II A ( T1c, N1a)  infiltrating ductal carcinoma diagnosed in December 2014.\par \par CASE SYNOPSIS:\par 8/2014 – mammogram at age 54 –abnormality in left breast UOQ, previously biopsied. \par \par 11/5/14 – core biopsy confirms malignancy .\par \par 12/10/14 – left lumpectomy; pathology 1.8 x 1.6 x 1.0 cm infiltrating ductal carcinoma with focal DCIS, LVI+, 1/1 SLN positive with 2.5 mm micrometastasis.\par \par 2/10/15 – started Tamoxifen (patient premenopausal at the time). \par \par 4/13/15 – completed XRT.\par \par 7/20/18 – mammogram/ sonogram: no evidence of disease.\par \par 3/6/19- mammogram/ breast US: no mammographic evidence of malignancy; stable hypoechoic nodule 1:00 right breast (previously biopsied).\par \par 6/11/19- D&C- endometrial  hyperplasia; + endometrial polyps x 3; no malignancy.\par \par 1/24/20- D& C; fragments of endometrial polyp in a background of weakly proliferative endometrium.\par \par 3/1/20- Plan to discontinue tamoxifen and switch to letrozole 2.5 mg daily. [FreeTextEntry1] : Tamoxifen- to be discontinued 2/29/20\par Letrozole 2.5 mg daily- to start March 1, 2020. [de-identified] : Last seen in office in June 2019. Ms. TOVAR is doing well, complies with new (as of March 1, 2020) adjuvant hormonal treatment ( Letrozole). She endorses minimal side effects. Last mammogram (3/4/20) consistent with no evidence of malignancy. Laboratory tests in normal range.  Patient denies new constitutional symptoms, remains active.  She is recently unemployed due to coronavirus pandemic.  Appears nontoxic.  Her last bone density was done in her gynecologist office–to retrieve results.

## 2020-09-24 ENCOUNTER — APPOINTMENT (OUTPATIENT)
Dept: CARDIOLOGY | Facility: CLINIC | Age: 60
End: 2020-09-24
Payer: COMMERCIAL

## 2020-09-24 PROCEDURE — 93306 TTE W/DOPPLER COMPLETE: CPT

## 2020-09-28 ENCOUNTER — RX RENEWAL (OUTPATIENT)
Age: 60
End: 2020-09-28

## 2020-10-01 ENCOUNTER — APPOINTMENT (OUTPATIENT)
Dept: CARDIOLOGY | Facility: CLINIC | Age: 60
End: 2020-10-01
Payer: COMMERCIAL

## 2020-10-01 ENCOUNTER — NON-APPOINTMENT (OUTPATIENT)
Age: 60
End: 2020-10-01

## 2020-10-01 VITALS
RESPIRATION RATE: 14 BRPM | SYSTOLIC BLOOD PRESSURE: 132 MMHG | BODY MASS INDEX: 31.68 KG/M2 | HEART RATE: 68 BPM | HEIGHT: 62 IN | TEMPERATURE: 98.1 F | DIASTOLIC BLOOD PRESSURE: 86 MMHG | WEIGHT: 172.13 LBS

## 2020-10-01 PROCEDURE — 93000 ELECTROCARDIOGRAM COMPLETE: CPT

## 2020-10-01 PROCEDURE — 99214 OFFICE O/P EST MOD 30 MIN: CPT

## 2020-10-01 NOTE — HISTORY OF PRESENT ILLNESS
[FreeTextEntry1] : She has been managing to do a moderate amount of physical exercise recently and generally feels well with this;\par \par Recent transthoracic echo from 9/24/20 demonstrates preserved cardiac chamber sizes with normal systolic function of the LV and normal ejection fraction of 60%;\par There was no significant valvulopathy noted;\par There was no pericardial effusion;

## 2020-10-01 NOTE — REASON FOR VISIT
[Follow-Up - Clinic] : a clinic follow-up of [FreeTextEntry1] : The patient is a 60-year-old woman who returns to the office today to discuss results of recent transthoracic echo;\par \par She was seen originally in consultation earlier this year with a history of atypical chest tightness and occasional exertional; and diffuse abnormal EKG of questionable significance;\par \par Overall, she is feeling much better and completed a transthoracic echo recently but was unable to do the stress echo which was postponed because of the coronavirus pandemic;\par \par Presently, she denies any significant chest pain, shortness of breath, palpitations or dizziness;

## 2020-10-01 NOTE — PHYSICAL EXAM
[General Appearance - Well Developed] : well developed [Normal Appearance] : normal appearance [Well Groomed] : well groomed [General Appearance - Well Nourished] : well nourished [No Deformities] : no deformities [General Appearance - In No Acute Distress] : no acute distress [Normal Conjunctiva] : the conjunctiva exhibited no abnormalities [Eyelids - No Xanthelasma] : the eyelids demonstrated no xanthelasmas [Normal Oral Mucosa] : normal oral mucosa [No Oral Pallor] : no oral pallor [No Oral Cyanosis] : no oral cyanosis [Normal Jugular Venous A Waves Present] : normal jugular venous A waves present [Normal Jugular Venous V Waves Present] : normal jugular venous V waves present [No Jugular Venous Joya A Waves] : no jugular venous joya A waves [Respiration, Rhythm And Depth] : normal respiratory rhythm and effort [Exaggerated Use Of Accessory Muscles For Inspiration] : no accessory muscle use [Auscultation Breath Sounds / Voice Sounds] : lungs were clear to auscultation bilaterally [Heart Rate And Rhythm] : heart rate and rhythm were normal [Heart Sounds] : normal S1 and S2 [Murmurs] : no murmurs present [FreeTextEntry1] : Overweight, soft, nontender [Abnormal Walk] : normal gait [Gait - Sufficient For Exercise Testing] : the gait was sufficient for exercise testing [Nail Clubbing] : no clubbing of the fingernails [Cyanosis, Localized] : no localized cyanosis [Petechial Hemorrhages (___cm)] : no petechial hemorrhages [Skin Color & Pigmentation] : normal skin color and pigmentation [] : no rash [No Venous Stasis] : no venous stasis [Skin Lesions] : no skin lesions [No Skin Ulcers] : no skin ulcer [No Xanthoma] : no  xanthoma was observed [Oriented To Time, Place, And Person] : oriented to person, place, and time [Affect] : the affect was normal [Mood] : the mood was normal [No Anxiety] : not feeling anxious

## 2020-10-01 NOTE — ASSESSMENT
[FreeTextEntry1] : EKG demonstrating normal sinus rhythm at a rate of 68 with left axis deviation and left anterior fascicular block with poor R-wave progression across the precordium and low voltage-essentially unchanged;\par \par in summary the patient is a 60-year-old woman who has a history of diffuse abnormal EKG and prior history of atypical chest discomfort and shortness of breath which seems to have improved over the past few months.;\par She had a stable/normal echocardiogram recently and has been back to exercising; and feeling well\par \par Plan:\par \par Discuss recommendation for future cardiac stress test- but not urgent at this time and will discuss my next visit after the holidays;\par \par  Return to this office within 4 months or p.r.n.\par Continue moderate physical exercise and nutritional weight loss plan; encouraged\par \par Timely checkups and laboratory blood tests w/ primary care recommended;

## 2020-11-01 PROBLEM — Z13.79 GENETIC TESTING: Status: RESOLVED | Noted: 2020-11-01 | Resolved: 2020-11-01

## 2020-11-05 ENCOUNTER — APPOINTMENT (OUTPATIENT)
Dept: BREAST CENTER | Facility: CLINIC | Age: 60
End: 2020-11-05
Payer: COMMERCIAL

## 2020-11-05 VITALS
BODY MASS INDEX: 31.28 KG/M2 | HEART RATE: 63 BPM | DIASTOLIC BLOOD PRESSURE: 87 MMHG | HEIGHT: 62 IN | SYSTOLIC BLOOD PRESSURE: 151 MMHG | WEIGHT: 170 LBS

## 2020-11-05 DIAGNOSIS — Z56.0 UNEMPLOYMENT, UNSPECIFIED: ICD-10-CM

## 2020-11-05 DIAGNOSIS — Z13.79 ENCOUNTER FOR OTHER SCREENING FOR GENETIC AND CHROMOSOMAL ANOMALIES: ICD-10-CM

## 2020-11-05 PROCEDURE — 99072 ADDL SUPL MATRL&STAF TM PHE: CPT

## 2020-11-05 PROCEDURE — 99214 OFFICE O/P EST MOD 30 MIN: CPT

## 2020-11-05 SDOH — ECONOMIC STABILITY - INCOME SECURITY: UNEMPLOYMENT, UNSPECIFIED: Z56.0

## 2020-11-05 NOTE — PHYSICAL EXAM
[Sclera nonicteric] : sclera nonicteric [Conjunctiva pink] : conjunctiva pink [Supple] : supple [No Supraclavicular Adenopathy] : no supraclavicular adenopathy [No Cervical Adenopathy] : no cervical adenopathy [No Thyromegaly] : no thyromegaly [Clear to Auscultation Bilat] : clear to auscultation bilaterally [Normal Sinus Rhythm] : normal sinus rhythm [No Gallops] : no gallops [No Rubs] : no pericardial rub [Examined in the supine and seated position] : examined in the supine and seated position [Symmetrical] : symmetrical [No dominant masses] : no dominant masses in right breast  [No dominant masses] : no dominant masses left breast [No Nipple Retraction] : no left nipple retraction [No Nipple Discharge] : no left nipple discharge [No Axillary Lymphadenopathy] : no left axillary lymphadenopathy [Soft] : abdomen soft [Not Tender] : non-tender [No Hepato-Splenomegaly] : no hepato-splenomegaly [No Swelling] : no swelling [Grade 3] : Ptosis Grade 3 [de-identified] : Lumpectomy incision at 12-1:00.  Radiation changes. [de-identified] : axillary incision .

## 2020-11-05 NOTE — DATA REVIEWED
[FreeTextEntry1] :   \par Breast MRI:  06/02/17   Findings: No suspicious findings.\par \par Mammogram/sonogram:  12/27/17    Findings:Dense tissue; distortion left UOQ.  New 4 mm nodular asymmetry medial left breast.   On ultrasound there are no suspicious findings. No correlate to the mammographic finding.\par \par Left stereotactic biopsy:  1/12/18   Pathology:  Leiomyoma left breast.\par \par Left Diagnostic Mammogram:  7/30/18   Findings:  Negative\par \par Bilateral mammogram:  3/4/20  Findings:  No suspicious masses or calcifications.\par Breast ultrasound: 3/4/20   findings:  Stable, biopsy proven benign 5 mm nodule Right breast 1:00 N12.\par Left breast - negative.\par \par

## 2020-11-05 NOTE — HISTORY OF PRESENT ILLNESS
[FreeTextEntry1] : 60 year old female with a history of stage 2A left breast carcinoma presents for a surveillance examination.   She has no complaints.

## 2020-11-28 ENCOUNTER — TRANSCRIPTION ENCOUNTER (OUTPATIENT)
Age: 60
End: 2020-11-28

## 2021-02-01 ENCOUNTER — APPOINTMENT (OUTPATIENT)
Dept: RHEUMATOLOGY | Facility: CLINIC | Age: 61
End: 2021-02-01

## 2021-02-02 ENCOUNTER — TRANSCRIPTION ENCOUNTER (OUTPATIENT)
Age: 61
End: 2021-02-02

## 2021-03-02 ENCOUNTER — APPOINTMENT (OUTPATIENT)
Dept: RHEUMATOLOGY | Facility: CLINIC | Age: 61
End: 2021-03-02
Payer: COMMERCIAL

## 2021-03-02 VITALS
WEIGHT: 179.38 LBS | TEMPERATURE: 98.3 F | BODY MASS INDEX: 33.01 KG/M2 | HEIGHT: 62 IN | DIASTOLIC BLOOD PRESSURE: 86 MMHG | HEART RATE: 79 BPM | OXYGEN SATURATION: 98 % | RESPIRATION RATE: 16 BRPM | SYSTOLIC BLOOD PRESSURE: 138 MMHG

## 2021-03-02 DIAGNOSIS — M25.50 PAIN IN UNSPECIFIED JOINT: ICD-10-CM

## 2021-03-02 DIAGNOSIS — R76.8 OTHER SPECIFIED ABNORMAL IMMUNOLOGICAL FINDINGS IN SERUM: ICD-10-CM

## 2021-03-02 DIAGNOSIS — R20.2 PARESTHESIA OF SKIN: ICD-10-CM

## 2021-03-02 DIAGNOSIS — M15.9 POLYOSTEOARTHRITIS, UNSPECIFIED: ICD-10-CM

## 2021-03-02 PROCEDURE — 99072 ADDL SUPL MATRL&STAF TM PHE: CPT

## 2021-03-02 PROCEDURE — 99204 OFFICE O/P NEW MOD 45 MIN: CPT

## 2021-03-05 ENCOUNTER — RESULT REVIEW (OUTPATIENT)
Age: 61
End: 2021-03-05

## 2021-03-05 ENCOUNTER — OUTPATIENT (OUTPATIENT)
Dept: OUTPATIENT SERVICES | Facility: HOSPITAL | Age: 61
LOS: 1 days | End: 2021-03-05
Payer: COMMERCIAL

## 2021-03-05 ENCOUNTER — APPOINTMENT (OUTPATIENT)
Dept: MAMMOGRAPHY | Facility: CLINIC | Age: 61
End: 2021-03-05
Payer: COMMERCIAL

## 2021-03-05 ENCOUNTER — APPOINTMENT (OUTPATIENT)
Dept: ULTRASOUND IMAGING | Facility: CLINIC | Age: 61
End: 2021-03-05
Payer: COMMERCIAL

## 2021-03-05 DIAGNOSIS — Z85.3 PERSONAL HISTORY OF MALIGNANT NEOPLASM OF BREAST: ICD-10-CM

## 2021-03-05 DIAGNOSIS — R92.2 INCONCLUSIVE MAMMOGRAM: ICD-10-CM

## 2021-03-05 DIAGNOSIS — Z00.8 ENCOUNTER FOR OTHER GENERAL EXAMINATION: ICD-10-CM

## 2021-03-05 PROCEDURE — 76641 ULTRASOUND BREAST COMPLETE: CPT | Mod: 26,50

## 2021-03-05 PROCEDURE — 77063 BREAST TOMOSYNTHESIS BI: CPT | Mod: 26

## 2021-03-05 PROCEDURE — 77063 BREAST TOMOSYNTHESIS BI: CPT

## 2021-03-05 PROCEDURE — 77067 SCR MAMMO BI INCL CAD: CPT

## 2021-03-05 PROCEDURE — 77067 SCR MAMMO BI INCL CAD: CPT | Mod: 26

## 2021-03-05 PROCEDURE — 77065 DX MAMMO INCL CAD UNI: CPT

## 2021-03-05 PROCEDURE — 76641 ULTRASOUND BREAST COMPLETE: CPT

## 2021-03-16 PROBLEM — M15.9 GENERALIZED OSTEOARTHRITIS OF MULTIPLE SITES: Status: ACTIVE | Noted: 2021-03-16

## 2021-03-16 PROBLEM — M25.50 MULTIPLE JOINT PAIN: Status: ACTIVE | Noted: 2021-03-16

## 2021-03-16 NOTE — PHYSICAL EXAM
[General Appearance - Alert] : alert [General Appearance - In No Acute Distress] : in no acute distress [General Appearance - Well Nourished] : well nourished [Sclera] : the sclera and conjunctiva were normal [PERRL With Normal Accommodation] : pupils were equal in size, round, and reactive to light [Extraocular Movements] : extraocular movements were intact [Outer Ear] : the ears and nose were normal in appearance [Oropharynx] : the oropharynx was normal [Neck Appearance] : the appearance of the neck was normal [Thyroid Diffuse Enlargement] : the thyroid was not enlarged [] : no respiratory distress [Auscultation Breath Sounds / Voice Sounds] : lungs were clear to auscultation bilaterally [Heart Rate And Rhythm] : heart rate was normal and rhythm regular [Heart Sounds] : normal S1 and S2 [Murmurs] : no murmurs [Edema] : there was no peripheral edema [Bowel Sounds] : normal bowel sounds [Abdomen Soft] : soft [Abdomen Tenderness] : non-tender [Cervical Lymph Nodes Enlarged Posterior Bilaterally] : posterior cervical [Cervical Lymph Nodes Enlarged Anterior Bilaterally] : anterior cervical [Supraclavicular Lymph Nodes Enlarged Bilaterally] : supraclavicular [No CVA Tenderness] : no ~M costovertebral angle tenderness [No Spinal Tenderness] : no spinal tenderness [Abnormal Walk] : normal gait [Nail Clubbing] : no clubbing  or cyanosis of the fingernails [Musculoskeletal - Swelling] : no joint swelling seen [Motor Tone] : muscle strength and tone were normal [Skin Color & Pigmentation] : normal skin color and pigmentation [FreeTextEntry1] : rosacea on face  [Motor Exam] : the motor exam was normal [No Focal Deficits] : no focal deficits [Oriented To Time, Place, And Person] : oriented to person, place, and time [Impaired Insight] : insight and judgment were intact [Affect] : the affect was normal

## 2021-03-16 NOTE — ASSESSMENT
[FreeTextEntry1] : EVERETT TOVAR is a 60 year old woman who presents for rheumatologic re-eval in setting of persistent JACKIE + but paucity of CTD sx. + arthralgias, myalgias, muscle spasm and neuropathic mediated pain improved with trigger injections and CTS injections in setting of + EMG/MRI findings consistent with pattern of involvement as well as + aromatase inhibitor which can cause myalgias/arthralgias. Low suspicion for inflammatory mediated neuropathic issues given intermittent involvement and no focal issues. Joint pain on exam today more consistent with mild OA. \par \par - c/w home exercises\par - prn Tylenol BID \par - can trial topical Voltaren gel prn pain up to TID to affected peripheral joints for OA -related pain \par - f/u with neurology \par - Discussed that JACKIE can be seen in 10-15% of normal population, + Ab incidence increases with age and with presence of other family members with hx of autoimmune dz or Ab positivity. Also discussed that JACKIE alone does not confer a diagnosis and that presently she does not meet criteria for SLE. \par - however will monitor for development of sx -- reviewed CTD sx with her, RTC prn if any develop

## 2021-03-16 NOTE — HISTORY OF PRESENT ILLNESS
[FreeTextEntry1] : EVERETT TOVAR is a 60 year old woman who presents with paresthesias in arms since 3/2020 -- following with neurology, EMG/NCV in Jun 2020 with R sided CTS and C spine impingement. RUE trigger point injections Feb 2021 with marked improvement b/l, CTS injection which helped as well. Currently doing PT, doing home exercises. Not needing PO pain meds. Using CTS bracing QHS. Here as prior hx of + serological testing and wanted to make sure above was not inflammatory mediated. \par \par Reports she last saw rheum 5-6 years ago, + JACKIE, sjogrens Abs reportedly. Seen for numbness in toes, which has now resolved. \par \par On ROS today endorses joint AM stiffness/synovitis but in above setting, no alexandre symovitis. SLE ROS negative for alopecia, sicca, salivary gland swelling, oral ulcers, malar rash, photosensitivity, SOB, chest pain, serositis, abd pain, dysuria, hematuria, rash, hematologic abnormalities, Raynauds. APLS ROS -  2 uncomplicated pregnancies, no miscarriages, no thrombotic events. \par \par + thumb CMC \par + b/l knee pain occasionally, worse with climbing stairs  \par + rosacea \par \par Labs - + JACKIE 1:160 (nuclear, speckled)\par Negative - CK, sUA, TFTs, B12/folate, Vit D, ACL, B2, phosphatidyl, C3/4, thyroid Abs, SSA/B, RF, G6PD, SPEP\par Previous rheum - Dr Carvajal \par Neurology -- Dr Ryoal Morales\par Orthopedist -- Dr Dominique, [place]

## 2021-03-21 ENCOUNTER — OUTPATIENT (OUTPATIENT)
Dept: OUTPATIENT SERVICES | Facility: HOSPITAL | Age: 61
LOS: 1 days | Discharge: HOME | End: 2021-03-21

## 2021-03-21 DIAGNOSIS — C50.912 MALIGNANT NEOPLASM OF UNSPECIFIED SITE OF LEFT FEMALE BREAST: ICD-10-CM

## 2021-03-21 DIAGNOSIS — Z87.898 PERSONAL HISTORY OF OTHER SPECIFIED CONDITIONS: ICD-10-CM

## 2021-03-23 ENCOUNTER — APPOINTMENT (OUTPATIENT)
Age: 61
End: 2021-03-23

## 2021-03-23 ENCOUNTER — RESULT REVIEW (OUTPATIENT)
Age: 61
End: 2021-03-23

## 2021-03-23 VITALS
TEMPERATURE: 97.8 F | DIASTOLIC BLOOD PRESSURE: 93 MMHG | WEIGHT: 177 LBS | SYSTOLIC BLOOD PRESSURE: 156 MMHG | HEART RATE: 67 BPM | BODY MASS INDEX: 32.37 KG/M2

## 2021-03-27 ENCOUNTER — RX RENEWAL (OUTPATIENT)
Age: 61
End: 2021-03-27

## 2021-03-30 ENCOUNTER — APPOINTMENT (OUTPATIENT)
Age: 61
End: 2021-03-30
Payer: COMMERCIAL

## 2021-03-30 VITALS — DIASTOLIC BLOOD PRESSURE: 93 MMHG | SYSTOLIC BLOOD PRESSURE: 156 MMHG

## 2021-03-30 PROCEDURE — 99214 OFFICE O/P EST MOD 30 MIN: CPT

## 2021-03-30 NOTE — HISTORY OF PRESENT ILLNESS
[de-identified] : 60 F, postmenopausal, of [place]ri[place] origin, with ER+, Her 2 negative, stage II A ( T1c, N1a)  infiltrating ductal carcinoma diagnosed in December 2014.\par \par CASE SYNOPSIS:\par 8/2014 – mammogram at age 54 –abnormality in left breast UOQ, previously biopsied. \par \par 11/5/14 – core biopsy confirms malignancy .\par \par 12/10/14 – left lumpectomy; pathology 1.8 x 1.6 x 1.0 cm infiltrating ductal carcinoma with focal DCIS, LVI+, 1/1 SLN positive with 2.5 mm micrometastasis.\par \par 2/10/15 – started Tamoxifen (patient premenopausal at the time). \par \par 4/13/15 – completed XRT.\par \par 7/20/18 – mammogram/ sonogram: no evidence of disease.\par \par 3/6/19- mammogram/ breast US: no mammographic evidence of malignancy; stable hypoechoic nodule 1:00 right breast (previously biopsied).\par \par 6/11/19- D&C- endometrial  hyperplasia; + endometrial polyps x 3; no malignancy.\par \par 1/24/20- D& C; fragments of endometrial polyp in a background of weakly proliferative endometrium.\par \par 3/1/20- Plan to discontinue tamoxifen and switch to letrozole 2.5 mg daily.\par \par 3/5/2021: Mammogram: Marked postlumpectomy changes in the left upper outer quadrant.  The lumpectomy site associated with additional questionable calcification on spot magnification radiographs were several circumferential new coarse calcifications are noted. Spot magnification radiographs are obtained of calcifications in the central inner left breast which are predominantly curvilinear.\par \par 3/5/2021: Breast ultrasound: Probable benign calcifications at the lumpectomy site as well as in the left central inner breast, based on fat necrosis.  Follow-up diagnostic left mammogram in 6-month recommended.\par  [FreeTextEntry1] : Tamoxifen- to be discontinued 2/29/20\par Letrozole 2.5 mg daily- to start March 1, 2020. [de-identified] : Ms. TOVAR has switched to aromatase inhibitor a year ago; earlier this month patient presented for a rheumatologic evaluation with arthralgias, myalgias, muscle spasm and neuropathic mediated pain, attributed in part to aromatase inhibitor as well as to persistent JACKIE.  She was not found to meet the criteria for SLE, and was treated with NSAIDs and topical Voltaren; she also follows up with neurology for persistent neuropathy.  She had recent mammogram/breast ultrasound that showed probable benign calcifications at the lumpectomy site and in the left central inner breast; 6-month repeat mammogram was recommended.  Continues to complain of bilateral knee pain worse with climbing stairs.  Most recent laboratory finding was a positive JACKIE 1: 160 (nucleolar, speckled).

## 2021-03-30 NOTE — PHYSICAL EXAM
[Fully active, able to carry on all pre-disease performance without restriction] : Status 0 - Fully active, able to carry on all pre-disease performance without restriction [Normal] : full range of motion and no deformities appreciated [de-identified] : left breast lumpectomy scar well healed. No masses, no nipple d/c bilaterally.

## 2021-04-01 ENCOUNTER — NON-APPOINTMENT (OUTPATIENT)
Age: 61
End: 2021-04-01

## 2021-04-01 ENCOUNTER — APPOINTMENT (OUTPATIENT)
Dept: CARDIOLOGY | Facility: CLINIC | Age: 61
End: 2021-04-01
Payer: COMMERCIAL

## 2021-04-01 VITALS
BODY MASS INDEX: 33.49 KG/M2 | HEIGHT: 62 IN | WEIGHT: 182 LBS | TEMPERATURE: 98.3 F | HEART RATE: 70 BPM | RESPIRATION RATE: 16 BRPM | SYSTOLIC BLOOD PRESSURE: 129 MMHG | DIASTOLIC BLOOD PRESSURE: 86 MMHG

## 2021-04-01 PROCEDURE — 99072 ADDL SUPL MATRL&STAF TM PHE: CPT

## 2021-04-01 PROCEDURE — 99214 OFFICE O/P EST MOD 30 MIN: CPT

## 2021-04-01 PROCEDURE — 93000 ELECTROCARDIOGRAM COMPLETE: CPT

## 2021-04-01 RX ORDER — MELOXICAM 15 MG/1
15 TABLET ORAL
Qty: 30 | Refills: 0 | Status: DISCONTINUED | COMMUNITY
Start: 2020-11-16 | End: 2021-04-01

## 2021-04-01 NOTE — HISTORY OF PRESENT ILLNESS
[FreeTextEntry1] : She has gained some additional weight over the past few months and now trying to be more physically active and to reduce;\par \par There has been some intermittent borderline elevated blood pressure readings but today seems to be back to normal;\par \par she was treated for some intermittent symptoms of pinched nerve in the right neck to the right arm and some carpal tunnel type numbness feelings and had some physical therapy;\par \par \par Echocardiogram from 9/24/20 did show normal cardiac chamber sizes with normal systolic function of LVH and EF of 60%. No significant valvulopathy seen. No pericardial effusion;

## 2021-04-01 NOTE — REVIEW OF SYSTEMS
[Recent Weight Gain (___ Lbs)] : recent [unfilled] ~Ulb weight gain [Chest  Pressure] : chest pressure [Negative] : Heme/Lymph

## 2021-04-01 NOTE — ASSESSMENT
[FreeTextEntry1] : EKG shows normal sinus rhythm at a rate of 70;   Q-S pattern in leads 2, 3, aVF.  Loss of R wave across precordial leads--essentially unchanged;\par \par \par In summary the patient is a 60-year-old woman who has a history of diffuse abnormal EKG and some intermittent left precordial chest discomfort but stable and normal recent echocardiogram;\par \par Plan:\par \par Patient recommended to schedule a stress echo study sometime in the near future to rule out any evidence for underlying CAD or ischemia;\par \par Encouraged to continue sensible low-sodium low-carb weight reducing diet;\par \par Modest physical walking exercise as tolerated;\par \par Return to office within 4-5 months or p.r.n.\par

## 2021-04-01 NOTE — REASON FOR VISIT
[Follow-Up - Clinic] : a clinic follow-up of [FreeTextEntry1] : The patient is a 60-year-old white female with a known history for atypical chest discomfort/tightness who has a history for diffusely abnormal EKG of questionable significance;\par \par This past fall she underwent a transthoracic echocardiogram and was previously recommended to have a stress test but this was postponed when Covid caused delays;\par \par Today, she states that she still gets occasional wheezing discomfort in the left precordial area but not particularly exertional related;\par \par There is been no significant palpitations, dyspnea, dizziness or syncope;

## 2021-05-03 ENCOUNTER — RESULT REVIEW (OUTPATIENT)
Age: 61
End: 2021-05-03

## 2021-05-03 ENCOUNTER — APPOINTMENT (OUTPATIENT)
Dept: BREAST CENTER | Facility: CLINIC | Age: 61
End: 2021-05-03
Payer: COMMERCIAL

## 2021-05-03 VITALS
DIASTOLIC BLOOD PRESSURE: 84 MMHG | HEART RATE: 67 BPM | HEIGHT: 62 IN | SYSTOLIC BLOOD PRESSURE: 135 MMHG | BODY MASS INDEX: 32.94 KG/M2 | WEIGHT: 179 LBS

## 2021-05-03 DIAGNOSIS — Z85.3 PERSONAL HISTORY OF MALIGNANT NEOPLASM OF BREAST: ICD-10-CM

## 2021-05-03 PROCEDURE — 99213 OFFICE O/P EST LOW 20 MIN: CPT

## 2021-05-03 PROCEDURE — 99072 ADDL SUPL MATRL&STAF TM PHE: CPT

## 2021-05-03 NOTE — PHYSICAL EXAM
[Sclera nonicteric] : sclera nonicteric [Conjunctiva pink] : conjunctiva pink [Supple] : supple [No Supraclavicular Adenopathy] : no supraclavicular adenopathy [No Cervical Adenopathy] : no cervical adenopathy [No Thyromegaly] : no thyromegaly [Clear to Auscultation Bilat] : clear to auscultation bilaterally [Normal Sinus Rhythm] : normal sinus rhythm [No Gallops] : no gallops [No Rubs] : no pericardial rub [Examined in the supine and seated position] : examined in the supine and seated position [Symmetrical] : symmetrical [Grade 3] : Ptosis Grade 3 [No dominant masses] : no dominant masses in right breast  [No dominant masses] : no dominant masses left breast [No Nipple Retraction] : no left nipple retraction [No Nipple Discharge] : no left nipple discharge [No Axillary Lymphadenopathy] : no left axillary lymphadenopathy [Soft] : abdomen soft [Not Tender] : non-tender [No Hepato-Splenomegaly] : no hepato-splenomegaly [No Swelling] : no swelling [de-identified] : Lumpectomy incision at 12-1:00.  Radiation changes. [de-identified] : axillary incision .

## 2021-08-04 ENCOUNTER — APPOINTMENT (OUTPATIENT)
Dept: CARDIOLOGY | Facility: CLINIC | Age: 61
End: 2021-08-04
Payer: COMMERCIAL

## 2021-08-04 ENCOUNTER — MED ADMIN CHARGE (OUTPATIENT)
Age: 61
End: 2021-08-04

## 2021-08-04 PROCEDURE — 93351 STRESS TTE COMPLETE: CPT

## 2021-08-04 RX ORDER — PERFLUTREN 6.52 MG/ML
6.52 INJECTION, SUSPENSION INTRAVENOUS
Qty: 4 | Refills: 0 | Status: COMPLETED | OUTPATIENT
Start: 2021-08-04

## 2021-08-04 RX ADMIN — PERFLUTREN MG/ML: 6.52 INJECTION, SUSPENSION INTRAVENOUS at 00:00

## 2021-08-10 ENCOUNTER — NON-APPOINTMENT (OUTPATIENT)
Age: 61
End: 2021-08-10

## 2021-08-10 ENCOUNTER — APPOINTMENT (OUTPATIENT)
Dept: CARDIOLOGY | Facility: CLINIC | Age: 61
End: 2021-08-10
Payer: COMMERCIAL

## 2021-08-10 VITALS
DIASTOLIC BLOOD PRESSURE: 90 MMHG | SYSTOLIC BLOOD PRESSURE: 140 MMHG | HEIGHT: 62 IN | BODY MASS INDEX: 33.03 KG/M2 | HEART RATE: 67 BPM | RESPIRATION RATE: 16 BRPM | WEIGHT: 179.5 LBS

## 2021-08-10 DIAGNOSIS — R94.39 ABNORMAL RESULT OF OTHER CARDIOVASCULAR FUNCTION STUDY: ICD-10-CM

## 2021-08-10 PROCEDURE — 99215 OFFICE O/P EST HI 40 MIN: CPT

## 2021-08-10 PROCEDURE — 93000 ELECTROCARDIOGRAM COMPLETE: CPT

## 2021-08-10 RX ORDER — ASCORBIC ACID 500 MG
TABLET ORAL
Refills: 0 | Status: ACTIVE | COMMUNITY

## 2021-08-10 NOTE — PHYSICAL EXAM
[Well Developed] : well developed [No Acute Distress] : no acute distress [Obese] : obese [Normal Conjunctiva] : normal conjunctiva [Normal Venous Pressure] : normal venous pressure [No Carotid Bruit] : no carotid bruit [Normal S1, S2] : normal S1, S2 [No Murmur] : no murmur [No Rub] : no rub [No Gallop] : no gallop [Clear Lung Fields] : clear lung fields [Good Air Entry] : good air entry [No Respiratory Distress] : no respiratory distress  [Soft] : abdomen soft [Non Tender] : non-tender [No Masses/organomegaly] : no masses/organomegaly [Normal Gait] : normal gait [No Edema] : no edema [No Cyanosis] : no cyanosis [No Clubbing] : no clubbing [No Rash] : no rash [No Skin Lesions] : no skin lesions [Moves all extremities] : moves all extremities [No Focal Deficits] : no focal deficits [Normal Speech] : normal speech [Alert and Oriented] : alert and oriented [Normal memory] : normal memory [Appears Anxious] : appears anxious

## 2021-08-18 NOTE — DISCUSSION/SUMMARY
[FreeTextEntry1] : 1).  Patient advised to schedule  Left Heart Catheterization to r/o CAD--in light of most recent abnormal Stress Echo and chest pain;\par \par 2).  No meds for now, will discuss the possibility of needing aspirin, Statin, beta blocker, ACEI pending results of cardiac cath.\par \par 3).  Immediately go to the emergency department and/or report any untoward symptoms to our office such as worsening chest discomfort, shortness of breath, excessive sweating, lightheadedness.\par \par 4).  Follow up with our office in 1 month or PRN.

## 2021-08-18 NOTE — ASSESSMENT
[FreeTextEntry1] : EKG 8/10/2021:  The EKG illustrates sinus rhythm, rate of 65 bpm, low voltage in precordial leads, Q's in leads III and aVF, poor R wave progression in precordial leads.\par \par Echocardiogram from 9/24/20 did show normal cardiac chamber sizes with normal systolic function of LVH and EF of 60%. No significant valvulopathy seen. No pericardial effusion;

## 2021-08-18 NOTE — HISTORY OF PRESENT ILLNESS
[FreeTextEntry1] : She does participate in aerobic exercise few days per week, but admits she does not particularly push herself;\par \par Patient is currently not on cardiac meds;\par \par Stress Echo Test (8/4/2021):  Patient exercised for only 5 METS where she developed shortness of breath and chest tightness during stress exam.  The EKG was negative for ischemia, but the stress echo images demonstrated hypokinesis in the anteroapical segments with exercise consistent with ischemia; + stress test;\par \par

## 2021-08-18 NOTE — REASON FOR VISIT
[FreeTextEntry1] : EVERETT TOVAR is being seen for a clinic follow-up of. \par \par The patient is a 60-year-old white female with a known history for atypical chest discomfort/tightness who has a history for diffusely abnormal EKG of questionable significance;\par \par She is back today for general cardiac checkup and to review results of most recent Stress Echo test;\par \par Today, she states that she still gets occasional wheezing and discomfort in the left precordial area but not particularly exertional related;\par \par There is been no significant palpitations, dyspnea, dizziness or syncope;

## 2021-08-25 DIAGNOSIS — Z01.818 ENCOUNTER FOR OTHER PREPROCEDURAL EXAMINATION: ICD-10-CM

## 2021-08-27 ENCOUNTER — APPOINTMENT (OUTPATIENT)
Dept: DISASTER EMERGENCY | Facility: CLINIC | Age: 61
End: 2021-08-27

## 2021-08-27 RX ORDER — CHLORHEXIDINE GLUCONATE 213 G/1000ML
1 SOLUTION TOPICAL ONCE
Refills: 0 | Status: DISCONTINUED | OUTPATIENT
Start: 2021-08-30 | End: 2021-09-13

## 2021-08-27 NOTE — H&P PST ADULT - REASON FOR ADMISSION
chest discomfort chest discomfort, abn ECG and Stress echo chest discomfort and SOB, abn ECG and Stress echo

## 2021-08-27 NOTE — H&P PST ADULT - ASSESSMENT
60yo female with c/o intermittent chest tightness and SOB that occurs mostly at rest and resting/exertional palpitations, followed by Dr. Moreno and noted to have ECG abnormality and and stress echo with hypokineses anteroapical wall with stress c/w ischemia, EF 60-65%, given continued symptoms in addition to ischemia on stress echo, will proceed with LHC today to be performed by Dr. Arguelles.     -plan for LHC via RA vs FA  -patient seen and examined  -confirmed appropriate NPO duration  -ECG and Labs reviewed  -Aspirin 81mg po pre-cath  -procedure discussed with patient; risks and benefits explained, questions answered  -consent obtained by attending IC

## 2021-08-27 NOTE — H&P PST ADULT - HISTORY OF PRESENT ILLNESS
This is a 61 y/o female with no significant medical history with known history for atypical chest discomfort/tightness who has a history for diffusely abnormal EKG of questionable significance. She states that she gets occasional  wheezing and discomfort in the left precordial area but not particularly exertional related.  Denies no significant palpitations, dyspnea, dizziness or syncope.  Stress Echo test 8/4/21 revealed hypokinesis in the anteroapical segments with exercise c/w ischemia.  She presents today for [facility] for further evaluation.    ASA  Mallampati   GFR  BRA  COVID    Symptoms:        Angina (Class):  2-3       Ischemic Symptoms:  chest discomfort    Heart Failure: NONE         Assessment of LVEF (Must be within 6 months):       EF: 60-65%       Assessed by: Stress Echo       Date: 8/4/21    Prior Cardiac Interventions (LHC, stents, CABG): NONE         Noninvasive Testing:   Stress Test: Date: 8/4/2021       Protocol: Moose       Duration of Exercise: 4 min 30 sec       Symptoms: SOB, fatigue, chest tightness       EKG Changes: negative for ischemia       DTS: 4       Myocardial Imaging: hypokinesis in the anteroseptal segments with exercise c/w ischemia       Risk Assessment (Low, Medium, High): Low    Echo (Date, Findings): 9/24/20: EF 55-60%; Mild TR; mild AR; normal LV size and wall thickness, with  normal systolic and diastolic function; trace MR; trace PVR; mild AV calcification    Antianginal Therapies: NONE         Associated Risk Factors:        Cerebrovascular Disease: N/A       Chronic Lung Disease: N/A       Peripheral Arterial Disease: N/A       Chronic Kidney Disease (if yes, what is GFR): N/A       Uncontrolled Diabetes (if yes, what is HgbA1C or FBS): N/A       Poorly Controlled Hypertension (if yes, what is SBP): N/A       Morbid Obesity (if yes, what is BMI): N/A       History of Recent Ventricular Arrhythmia: N/A       Inability to Ambulate Safely: N/A       Need for Therapeutic Anticoagulation: N/A       Antiplatelet or Contrast Allergy: N/A This is a 61 y/o female with h/o left breast cancer s/p lumpectomy and LND, c/o intermittent chest tightness and SOB that occurs mostly at rest and resting/exertional palpitations, followed by Dr. Moreno and noted to have ECG abnormality and and stress echo with hypokineses anteroapical wall with stress c/w ischemia, EF 60-65%, given continued symptoms in addition to ischemia on stress echo, will proceed with LHC today to be performed by Dr. Arguelles.     ASA 2  Mallampati 2  GFR  BRA    Symptoms:        Angina (Class):  antypical, class III-IV       Ischemic Symptoms:  resting chest discomfort and SOB, exertional palpitations    Heart Failure: NONE         Assessment of LVEF (Must be within 6 months):       EF: 60-65%       Assessed by: Stress Echo       Date: 8/4/21    Prior Cardiac Interventions (LHC, stents, CABG): NONE         Noninvasive Testing:   Stress Test: Date: 8/4/2021       Protocol: Moose       Duration of Exercise: 4 min 30 sec       Symptoms: SOB, fatigue, chest tightness       EKG Changes: negative for ischemia       DTS: 4       Myocardial Imaging: hypokinesis in the anteroseptal segments with exercise c/w ischemia       Risk Assessment (Low, Medium, High): Low    Echo (Date, Findings): 9/24/20: EF 55-60%; Mild TR; mild AR; normal LV size and wall thickness, with  normal systolic and diastolic function; trace MR; trace PVR; mild AV calcification    Antianginal Therapies: NONE         Associated Risk Factors:        Cerebrovascular Disease: N/A       Chronic Lung Disease: N/A       Peripheral Arterial Disease: N/A       Chronic Kidney Disease (if yes, what is GFR): N/A       Uncontrolled Diabetes (if yes, what is HgbA1C or FBS): N/A       Poorly Controlled Hypertension (if yes, what is SBP): N/A       Morbid Obesity (if yes, what is BMI): N/A       History of Recent Ventricular Arrhythmia: N/A       Inability to Ambulate Safely: N/A       Need for Therapeutic Anticoagulation: N/A       Antiplatelet or Contrast Allergy: N/A    ROS: as stated above, otherwise negative    PHYSICAL EXAM:  Constitutional: A & O x 3, NAD  HEENT:  Normal oral mucosa, PERRL, EOMI	  Cardiovascular: S1 S2, No murmurs, No JVD  Respiratory: Lungs clear to auscultation	  Gastrointestinal:  Soft, Non-tender, + BS	  Skin: No rashes or cyanosis  Neurologic: No deficit appreciated  Extremities: Normal range of motion, no edema  Vascular: distal pulses +    This is a 59 y/o female with h/o left breast cancer s/p lumpectomy, LND and RT 2014-15, c/o intermittent chest tightness and SOB that occurs mostly at rest and resting/exertional palpitations, followed by Dr. Moreno and noted to have ECG abnormality and and stress echo with hypokineses anteroapical wall with stress c/w ischemia, EF 60-65%, given continued symptoms in addition to ischemia on stress echo, will proceed with LHC today to be performed by Dr. Arguelles.     ASA 2  Mallampati 2  GFR  BRA    Symptoms:        Angina (Class):  antypical, class III-IV       Ischemic Symptoms:  resting chest discomfort and SOB, exertional palpitations    Heart Failure: NONE         Assessment of LVEF (Must be within 6 months):       EF: 60-65%       Assessed by: Stress Echo       Date: 8/4/21    Prior Cardiac Interventions (LHC, stents, CABG): NONE         Noninvasive Testing:   Stress Test: Date: 8/4/2021       Protocol: Moose       Duration of Exercise: 4 min 30 sec       Symptoms: SOB, fatigue, chest tightness       EKG Changes: negative for ischemia       DTS: 4       Myocardial Imaging: hypokinesis in the anteroseptal segments with exercise c/w ischemia       Risk Assessment (Low, Medium, High): Low    Echo (Date, Findings): 9/24/20: EF 55-60%; Mild TR; mild AR; normal LV size and wall thickness, with  normal systolic and diastolic function; trace MR; trace PVR; mild AV calcification    Antianginal Therapies: NONE         Associated Risk Factors:        Cerebrovascular Disease: N/A       Chronic Lung Disease: N/A       Peripheral Arterial Disease: N/A       Chronic Kidney Disease (if yes, what is GFR): N/A       Uncontrolled Diabetes (if yes, what is HgbA1C or FBS): N/A       Poorly Controlled Hypertension (if yes, what is SBP): N/A       Morbid Obesity (if yes, what is BMI): N/A       History of Recent Ventricular Arrhythmia: N/A       Inability to Ambulate Safely: N/A       Need for Therapeutic Anticoagulation: N/A       Antiplatelet or Contrast Allergy: N/A    ROS: as stated above, otherwise negative    PHYSICAL EXAM:  Constitutional: A & O x 3, NAD  HEENT:  Normal oral mucosa, PERRL, EOMI	  Cardiovascular: S1 S2, No murmurs, No JVD  Respiratory: Lungs clear to auscultation	  Gastrointestinal:  Soft, Non-tender, + BS	  Skin: No rashes or cyanosis  Neurologic: No deficit appreciated  Extremities: Normal range of motion, no edema  Vascular: distal pulses +    This is a 59 y/o female with h/o left breast cancer s/p lumpectomy, LND and RT 2014-15, c/o intermittent chest tightness and SOB that occurs mostly at rest and resting/exertional palpitations, followed by Dr. Moreno and noted to have ECG abnormality and and stress echo with hypokineses anteroapical wall with stress c/w ischemia, EF 60-65%, given continued symptoms in addition to ischemia on stress echo, will proceed with LHC today to be performed by Dr. Arguelles.     ASA 2  Mallampati 2  GFR 96  BRA 1.7%    Symptoms:        Angina (Class):  antypical, class III-IV       Ischemic Symptoms:  resting chest discomfort and SOB, exertional palpitations    Heart Failure: NONE         Assessment of LVEF (Must be within 6 months):       EF: 60-65%       Assessed by: Stress Echo       Date: 8/4/21    Prior Cardiac Interventions (LHC, stents, CABG): NONE         Noninvasive Testing:   Stress Test: Date: 8/4/2021       Protocol: Moose       Duration of Exercise: 4 min 30 sec       Symptoms: SOB, fatigue, chest tightness       EKG Changes: negative for ischemia       DTS: 4       Myocardial Imaging: hypokinesis in the anteroseptal segments with exercise c/w ischemia       Risk Assessment (Low, Medium, High): Low    Echo (Date, Findings): 9/24/20: EF 55-60%; Mild TR; mild AR; normal LV size and wall thickness, with  normal systolic and diastolic function; trace MR; trace PVR; mild AV calcification    Antianginal Therapies: NONE         Associated Risk Factors:        Cerebrovascular Disease: N/A       Chronic Lung Disease: N/A       Peripheral Arterial Disease: N/A       Chronic Kidney Disease (if yes, what is GFR): N/A       Uncontrolled Diabetes (if yes, what is HgbA1C or FBS): N/A       Poorly Controlled Hypertension (if yes, what is SBP): N/A       Morbid Obesity (if yes, what is BMI): N/A       History of Recent Ventricular Arrhythmia: N/A       Inability to Ambulate Safely: N/A       Need for Therapeutic Anticoagulation: N/A       Antiplatelet or Contrast Allergy: N/A    ROS: as stated above, otherwise negative    PHYSICAL EXAM:  Constitutional: A & O x 3, NAD  HEENT:  Normal oral mucosa, PERRL, EOMI	  Cardiovascular: S1 S2, No murmurs, No JVD  Respiratory: Lungs clear to auscultation	  Gastrointestinal:  Soft, Non-tender, + BS	  Skin: No rashes or cyanosis  Neurologic: No deficit appreciated  Extremities: Normal range of motion, no edema  Vascular: distal pulses +

## 2021-08-28 LAB — SARS-COV-2 N GENE NPH QL NAA+PROBE: NOT DETECTED

## 2021-08-30 ENCOUNTER — OUTPATIENT (OUTPATIENT)
Dept: OUTPATIENT SERVICES | Facility: HOSPITAL | Age: 61
LOS: 1 days | Discharge: HOME | End: 2021-08-30
Payer: COMMERCIAL

## 2021-08-30 ENCOUNTER — TRANSCRIPTION ENCOUNTER (OUTPATIENT)
Age: 61
End: 2021-08-30

## 2021-08-30 VITALS
SYSTOLIC BLOOD PRESSURE: 126 MMHG | RESPIRATION RATE: 16 BRPM | HEART RATE: 69 BPM | DIASTOLIC BLOOD PRESSURE: 78 MMHG | OXYGEN SATURATION: 98 %

## 2021-08-30 VITALS
TEMPERATURE: 98 F | DIASTOLIC BLOOD PRESSURE: 85 MMHG | OXYGEN SATURATION: 98 % | SYSTOLIC BLOOD PRESSURE: 165 MMHG | RESPIRATION RATE: 16 BRPM | WEIGHT: 175.93 LBS | HEART RATE: 70 BPM | HEIGHT: 62 IN

## 2021-08-30 DIAGNOSIS — R94.39 ABNORMAL RESULT OF OTHER CARDIOVASCULAR FUNCTION STUDY: ICD-10-CM

## 2021-08-30 LAB
ANION GAP SERPL CALC-SCNC: 12 MMOL/L — SIGNIFICANT CHANGE UP (ref 5–17)
BASOPHILS # BLD AUTO: 0.04 K/UL — SIGNIFICANT CHANGE UP (ref 0–0.2)
BASOPHILS NFR BLD AUTO: 0.6 % — SIGNIFICANT CHANGE UP (ref 0–2)
BUN SERPL-MCNC: 14.3 MG/DL — SIGNIFICANT CHANGE UP (ref 8–20)
CALCIUM SERPL-MCNC: 9.8 MG/DL — SIGNIFICANT CHANGE UP (ref 8.6–10.2)
CHLORIDE SERPL-SCNC: 106 MMOL/L — SIGNIFICANT CHANGE UP (ref 98–107)
CHOLEST SERPL-MCNC: 196 MG/DL — SIGNIFICANT CHANGE UP
CO2 SERPL-SCNC: 25 MMOL/L — SIGNIFICANT CHANGE UP (ref 22–29)
CREAT SERPL-MCNC: 0.67 MG/DL — SIGNIFICANT CHANGE UP (ref 0.5–1.3)
EOSINOPHIL # BLD AUTO: 0.24 K/UL — SIGNIFICANT CHANGE UP (ref 0–0.5)
EOSINOPHIL NFR BLD AUTO: 3.9 % — SIGNIFICANT CHANGE UP (ref 0–6)
GLUCOSE SERPL-MCNC: 94 MG/DL — SIGNIFICANT CHANGE UP (ref 70–99)
HCT VFR BLD CALC: 40.4 % — SIGNIFICANT CHANGE UP (ref 34.5–45)
HDLC SERPL-MCNC: 48 MG/DL — LOW
HGB BLD-MCNC: 12.9 G/DL — SIGNIFICANT CHANGE UP (ref 11.5–15.5)
IMM GRANULOCYTES NFR BLD AUTO: 0.5 % — SIGNIFICANT CHANGE UP (ref 0–1.5)
LIPID PNL WITH DIRECT LDL SERPL: 111 MG/DL — HIGH
LYMPHOCYTES # BLD AUTO: 1.45 K/UL — SIGNIFICANT CHANGE UP (ref 1–3.3)
LYMPHOCYTES # BLD AUTO: 23.3 % — SIGNIFICANT CHANGE UP (ref 13–44)
MAGNESIUM SERPL-MCNC: 2.2 MG/DL — SIGNIFICANT CHANGE UP (ref 1.6–2.6)
MCHC RBC-ENTMCNC: 27.6 PG — SIGNIFICANT CHANGE UP (ref 27–34)
MCHC RBC-ENTMCNC: 31.9 GM/DL — LOW (ref 32–36)
MCV RBC AUTO: 86.3 FL — SIGNIFICANT CHANGE UP (ref 80–100)
MONOCYTES # BLD AUTO: 0.56 K/UL — SIGNIFICANT CHANGE UP (ref 0–0.9)
MONOCYTES NFR BLD AUTO: 9 % — SIGNIFICANT CHANGE UP (ref 2–14)
NEUTROPHILS # BLD AUTO: 3.9 K/UL — SIGNIFICANT CHANGE UP (ref 1.8–7.4)
NEUTROPHILS NFR BLD AUTO: 62.7 % — SIGNIFICANT CHANGE UP (ref 43–77)
NON HDL CHOLESTEROL: 148 MG/DL — HIGH
PLATELET # BLD AUTO: 219 K/UL — SIGNIFICANT CHANGE UP (ref 150–400)
POTASSIUM SERPL-MCNC: 3.8 MMOL/L — SIGNIFICANT CHANGE UP (ref 3.5–5.3)
POTASSIUM SERPL-SCNC: 3.8 MMOL/L — SIGNIFICANT CHANGE UP (ref 3.5–5.3)
RBC # BLD: 4.68 M/UL — SIGNIFICANT CHANGE UP (ref 3.8–5.2)
RBC # FLD: 14.6 % — HIGH (ref 10.3–14.5)
SODIUM SERPL-SCNC: 143 MMOL/L — SIGNIFICANT CHANGE UP (ref 135–145)
TRIGL SERPL-MCNC: 185 MG/DL — HIGH
WBC # BLD: 6.22 K/UL — SIGNIFICANT CHANGE UP (ref 3.8–10.5)
WBC # FLD AUTO: 6.22 K/UL — SIGNIFICANT CHANGE UP (ref 3.8–10.5)

## 2021-08-30 PROCEDURE — C1894: CPT

## 2021-08-30 PROCEDURE — 85025 COMPLETE CBC W/AUTO DIFF WBC: CPT

## 2021-08-30 PROCEDURE — 83735 ASSAY OF MAGNESIUM: CPT

## 2021-08-30 PROCEDURE — C1769: CPT

## 2021-08-30 PROCEDURE — 36415 COLL VENOUS BLD VENIPUNCTURE: CPT

## 2021-08-30 PROCEDURE — 93005 ELECTROCARDIOGRAM TRACING: CPT

## 2021-08-30 PROCEDURE — 93010 ELECTROCARDIOGRAM REPORT: CPT

## 2021-08-30 PROCEDURE — 93458 L HRT ARTERY/VENTRICLE ANGIO: CPT

## 2021-08-30 PROCEDURE — C1887: CPT

## 2021-08-30 PROCEDURE — 93458 L HRT ARTERY/VENTRICLE ANGIO: CPT | Mod: 26

## 2021-08-30 PROCEDURE — 99153 MOD SED SAME PHYS/QHP EA: CPT

## 2021-08-30 PROCEDURE — 80048 BASIC METABOLIC PNL TOTAL CA: CPT

## 2021-08-30 PROCEDURE — 99152 MOD SED SAME PHYS/QHP 5/>YRS: CPT | Mod: 59

## 2021-08-30 PROCEDURE — 80061 LIPID PANEL: CPT

## 2021-08-30 PROCEDURE — 99152 MOD SED SAME PHYS/QHP 5/>YRS: CPT

## 2021-08-30 RX ORDER — ASPIRIN/CALCIUM CARB/MAGNESIUM 324 MG
81 TABLET ORAL DAILY
Refills: 0 | Status: DISCONTINUED | OUTPATIENT
Start: 2021-08-30 | End: 2021-09-13

## 2021-08-30 RX ORDER — VITAMIN E 100 UNIT
1 CAPSULE ORAL
Qty: 0 | Refills: 0 | DISCHARGE

## 2021-08-30 RX ORDER — ASCORBIC ACID 60 MG
1 TABLET,CHEWABLE ORAL
Qty: 0 | Refills: 0 | DISCHARGE

## 2021-08-30 RX ORDER — LETROZOLE 2.5 MG/1
1 TABLET, FILM COATED ORAL
Qty: 0 | Refills: 0 | DISCHARGE

## 2021-08-30 RX ORDER — SODIUM CHLORIDE 9 MG/ML
1000 INJECTION INTRAMUSCULAR; INTRAVENOUS; SUBCUTANEOUS
Refills: 0 | Status: DISCONTINUED | OUTPATIENT
Start: 2021-08-30 | End: 2021-09-13

## 2021-08-30 RX ORDER — PREGABALIN 225 MG/1
1 CAPSULE ORAL
Qty: 0 | Refills: 0 | DISCHARGE

## 2021-08-30 RX ORDER — CHOLECALCIFEROL (VITAMIN D3) 125 MCG
1 CAPSULE ORAL
Qty: 0 | Refills: 0 | DISCHARGE

## 2021-08-30 RX ORDER — POTASSIUM CHLORIDE 20 MEQ
20 PACKET (EA) ORAL ONCE
Refills: 0 | Status: DISCONTINUED | OUTPATIENT
Start: 2021-08-30 | End: 2021-09-13

## 2021-08-30 NOTE — DISCHARGE NOTE PROVIDER - NSDCFUADDINST_GEN_ALL_CORE_FT
Go to the ED with any acute onset of chest pain, palpitations, shortness of breath or dizziness.   Managing risk factors will help prevent future blockages, risk factors may include: high blood pressure, high cholesterol, obesity, sedentary life style and smoking.    Your diet should be low in fat, cholesterol, salt and carbohydrates, increase fruits (caution if diabetic), vegetables and whole grains/fiber rich foods.   Take all your cardiac  medications as prescribed.    Restricted use with no heavy lifting of affected arm for 48 hours.  No submerging the arm in water for 48 hours.  You may start showering today.  Call your doctor for any bleeding, swelling, loss of sensation in the hand or fingers, or fingers turning blue.  If heavy bleeding or large lumps form, hold pressure at the spot and come to the Emergency Room.    Exercise is a very important factor in heart health. Once your post procedure restrictions have passed, you should engage in heart healthy, aerobic exercise. Be sure to have clearance from your cardiologist. Cardiac rehab programs could be extremely beneficial and your cardiologist could help set this up.   Follow up with your cardiologist within 1-2 weeks after your procedure.   Call your cardiologist or our unit ([phone]) with any questions or concerns that may arise.

## 2021-08-30 NOTE — DISCHARGE NOTE PROVIDER - NSDCCPTREATMENT_GEN_ALL_CORE_FT
PRINCIPAL PROCEDURE  Procedure: Left heart cardiac cath  Findings and Treatment: Go to the ED with any acute onset of chest pain, palpitations, shortness of breath or dizziness.   Managing risk factors will help prevent future blockages, risk factors may include: high blood pressure, high cholesterol, obesity, sedentary life style and smoking.    Your diet should be low in fat, cholesterol, salt and carbohydrates, increase fruits (caution if diabetic), vegetables and whole grains/fiber rich foods.   Take all your cardiac  medications as prescribed.    Restricted use with no heavy lifting of affected arm for 48 hours.  No submerging the arm in water for 48 hours.  You may start showering today.  Call your doctor for any bleeding, swelling, loss of sensation in the hand or fingers, or fingers turning blue.  If heavy bleeding or large lumps form, hold pressure at the spot and come to the Emergency Room.  Exercise is a very important factor in heart health. Once your post procedure restrictions have passed, you should engage in heart healthy, aerobic exercise. Be sure to have clearance from your cardiologist. Cardiac rehab programs could be extremely beneficial and your cardiologist could help set this up.   Follow up with your cardiologist within 1-2 weeks after your procedure.   Call your cardiologist or our unit ([phone]) with any questions or concerns that may arise.

## 2021-08-30 NOTE — DISCHARGE NOTE PROVIDER - CARE PROVIDER_API CALL
Meño Moreno)  Internal Medicine  [address] [place] [address]  Phone: [phone]  Fax: [phone]  Follow Up Time:

## 2021-08-30 NOTE — DISCHARGE NOTE PROVIDER - HOSPITAL COURSE
History of Present Illness    This is a 59 y/o female with h/o left breast cancer s/p lumpectomy, LND and RT 2014-15, c/o intermittent chest tightness and SOB that occurs mostly at rest and resting/exertional palpitations, followed by Dr. Moreno and noted to have ECG abnormality and and stress echo with hypokineses anteroapical wall with stress c/w ischemia, EF 60-65%, given continued symptoms in addition to ischemia on stress echo, will proceed with LHC today to be performed by Dr. Arguelles.       -plan for LHC via RA vs FA  -patient seen and examined  -confirmed appropriate NPO duration  -ECG and Labs reviewed  -Aspirin 81mg po pre-cath  -procedure discussed with patient; risks and benefits explained, questions answered  -consent obtained by attending IC    Now s/p left heart catheterization via RRA with no obstructive CAD, procedure performed by  Dr. Arguelles, received IA heparin, versed and fentanyl IV intraprocedurally, arrived to recovery in NAD and HDS, RRA access site stable with compression band in place, no bleed/hematoma, distal pulse +, D/C home once recovery period completed.     Plan:  -Formal cath report pending  -Post procedure management/monitoring per protocol  -Access site precautions  -Radial compression band removal at 1400  -Bedrest x 1hours post procedure  -Continue current medical therapy  -Educated regarding post procedure management and care  -Discussed the importance of RF modification  -F/U outpt in 1-2 weeks with Cardiologist Dr. Moreno  -DISPO: Plan for D/C once recovery period completed

## 2021-08-30 NOTE — DISCHARGE NOTE NURSING/CASE MANAGEMENT/SOCIAL WORK - PATIENT PORTAL LINK FT
You can access the FollowMyHealth Patient Portal offered by [facility] by registering at the following website: [email or web address]. By joining [employer]’s FollowMyHealth portal, you will also be able to view your health information using other applications (apps) compatible with our system.

## 2021-08-30 NOTE — DISCHARGE NOTE PROVIDER - NSDCCPCAREPLAN_GEN_ALL_CORE_FT
PRINCIPAL DISCHARGE DIAGNOSIS  Diagnosis: Chest pain syndrome  Assessment and Plan of Treatment: The cardiac catheterization has ruled out significant coronary artery disease as the cause of your chest pain, shortness of breath and palpitations. You do have some minimal disease and should cont to take your medications as prescribed and manage risk factors such as high cholesterol and high blood pressure and follow a heart healthy diet and exercise. This will prevent future significant coronary disease. You should follow up with your doctor to further investigate what the cause of your symptoms may be.

## 2021-08-30 NOTE — DISCHARGE NOTE PROVIDER - NSDCFUSCHEDAPPT_GEN_ALL_CORE_FT
EVERETT TOVAR ; 09/08/2021 ; NPP Cardiology [address]  EVERETT TOVAR ; 09/10/2021 ; NPP Rad [facility]st[facility] [address]  EVERETT TOVAR ; 09/21/2021 ; NPP [facility]  EVERETT TOVAR ; 10/05/2021 ; NPP Med Breast [address]

## 2021-08-30 NOTE — DISCHARGE NOTE PROVIDER - NSDCMRMEDTOKEN_GEN_ALL_CORE_FT
calcium (as calcium citrate) 250 mg oral tablet: 1 tab(s) orally 2 times a day  letrozole 2.5 mg oral tablet: 1 tab(s) orally once a day  Multiple Vitamins oral capsule: 1 cap(s) orally once a day  Vitamin B12 500 mcg oral tablet: 1 tab(s) orally once a day  Vitamin C 500 mg oral capsule: 1 cap(s) orally once a day  Vitamin D3 50 mcg (2000 intl units) oral capsule: 1 cap(s) orally once a day  vitamin E 400 intl units oral capsule: 1 cap(s) orally once a day

## 2021-09-03 DIAGNOSIS — I20.0 UNSTABLE ANGINA: ICD-10-CM

## 2021-09-10 ENCOUNTER — RESULT REVIEW (OUTPATIENT)
Age: 61
End: 2021-09-10

## 2021-09-10 ENCOUNTER — APPOINTMENT (OUTPATIENT)
Dept: MAMMOGRAPHY | Facility: CLINIC | Age: 61
End: 2021-09-10
Payer: COMMERCIAL

## 2021-09-10 ENCOUNTER — OUTPATIENT (OUTPATIENT)
Dept: OUTPATIENT SERVICES | Facility: HOSPITAL | Age: 61
LOS: 1 days | End: 2021-09-10
Payer: COMMERCIAL

## 2021-09-10 DIAGNOSIS — R92.0 MAMMOGRAPHIC MICROCALCIFICATION FOUND ON DIAGNOSTIC IMAGING OF BREAST: ICD-10-CM

## 2021-09-10 PROBLEM — Z86.69 PERSONAL HISTORY OF OTHER DISEASES OF THE NERVOUS SYSTEM AND SENSE ORGANS: Chronic | Status: ACTIVE | Noted: 2021-08-27

## 2021-09-10 PROCEDURE — 77065 DX MAMMO INCL CAD UNI: CPT

## 2021-09-10 PROCEDURE — 77065 DX MAMMO INCL CAD UNI: CPT | Mod: 26,LT

## 2021-09-14 ENCOUNTER — NON-APPOINTMENT (OUTPATIENT)
Age: 61
End: 2021-09-14

## 2021-09-14 ENCOUNTER — APPOINTMENT (OUTPATIENT)
Dept: CARDIOLOGY | Facility: CLINIC | Age: 61
End: 2021-09-14
Payer: COMMERCIAL

## 2021-09-14 VITALS
BODY MASS INDEX: 32.57 KG/M2 | HEIGHT: 62 IN | RESPIRATION RATE: 16 BRPM | WEIGHT: 177 LBS | SYSTOLIC BLOOD PRESSURE: 158 MMHG | HEART RATE: 62 BPM | DIASTOLIC BLOOD PRESSURE: 94 MMHG

## 2021-09-14 PROCEDURE — 99214 OFFICE O/P EST MOD 30 MIN: CPT

## 2021-09-14 PROCEDURE — 93000 ELECTROCARDIOGRAM COMPLETE: CPT

## 2021-09-14 NOTE — ASSESSMENT
[FreeTextEntry1] : EKG 9/14/2021:  The EKG illustrates sinus rhythm, rate of 62 bpm, low voltage in precordial leads, PRWP across precordial leads, leftward axis deviation, nonspecific T wave abnormality.

## 2021-09-14 NOTE — HISTORY OF PRESENT ILLNESS
[FreeTextEntry1] : Once again, she does participate in aerobic exercise few days per week for about 30 minutes, but admits she does not particularly push herself;\par \par Patient is currently not on cardiac meds;\par \par Stress Echo Test (8/4/2021): Patient exercised for only 5 METS where she developed shortness of breath and chest tightness during stress exam. The EKG was negative for ischemia, but the stress echo images demonstrated hypokinesis in the anteroapical segments with exercise consistent with ischemia; + stress test;\par \par Left Heart Catheterization (8/30/2021):  Proximal LAD: 30% stenosis. Otherwise, there was no other significant stenosis noted on other vessels. Non-obstructive coronary artery disease, advised to take Aspirin;\par \par In-hospital blood work (8/30/2021):  Total Cholesterol (196), LDL (111), HDL (48), Triglycerides (185);\par \par Blood pressures remain elevated with measurements in the 140's to 150's over 90's;

## 2021-09-14 NOTE — PHYSICAL EXAM
[No Acute Distress] : no acute distress [Obese] : obese [Normal Conjunctiva] : normal conjunctiva [Normal Venous Pressure] : normal venous pressure [No Carotid Bruit] : no carotid bruit [Normal S1, S2] : normal S1, S2 [No Murmur] : no murmur [No Rub] : no rub [No Gallop] : no gallop [Clear Lung Fields] : clear lung fields [Good Air Entry] : good air entry [No Respiratory Distress] : no respiratory distress  [Soft] : abdomen soft [Non Tender] : non-tender [No Masses/organomegaly] : no masses/organomegaly [Normal Gait] : normal gait [No Edema] : no edema [No Cyanosis] : no cyanosis [No Clubbing] : no clubbing [No Rash] : no rash [No Skin Lesions] : no skin lesions [Moves all extremities] : moves all extremities [No Focal Deficits] : no focal deficits [Normal Speech] : normal speech [Alert and Oriented] : alert and oriented [Normal memory] : normal memory [Appears Anxious] : appears anxious

## 2021-09-14 NOTE — DISCUSSION/SUMMARY
[FreeTextEntry1] : 1).  Patient now presents s/p LHC demonstrating non-obstructive CAD with 30% stenosis of proximal LAD. Her cholesterol levels were slightly elevated with LDL >110.\par \par Advised to empirically begin taking Atorvastatin 10 mg QHS.\par \par She will begin taking ASA 81 mg daily as well.\par \par Diet and lifestyle modification discussed including low sodium, low fat and low carbohydrate weight reducing diet.  Patient is to implement moderate aerobic exercise regimen few days per week, approximately 20 to 30 mins daily. \par \par 2).  Blood pressure remains suboptimally elevated in the 140's to 150's over 90's.\par \par Will empirically begin Lisinopril 20 mg daily in hopes of more optimal blood pressure control.\par \par 3).  Follow up with PCP (Dr. Koenig) in approximately 2-3 months regarding routine checkups and blood work including fasting lipid panel, electrolytes and renal / hepatic function.  Forward all testing/lab work to our office. \par \par 4).  No additional cardiac testing indicated at this time. \par \par 5).  Recommend patient report any untoward symptoms. \par \par 6).  Follow up with Dr. Moreno in 3 to 4 months or PRN.

## 2021-09-18 ENCOUNTER — OUTPATIENT (OUTPATIENT)
Dept: OUTPATIENT SERVICES | Facility: HOSPITAL | Age: 61
LOS: 1 days | Discharge: HOME | End: 2021-09-18

## 2021-09-18 DIAGNOSIS — C50.912 MALIGNANT NEOPLASM OF UNSPECIFIED SITE OF LEFT FEMALE BREAST: ICD-10-CM

## 2021-09-20 ENCOUNTER — OUTPATIENT (OUTPATIENT)
Dept: OUTPATIENT SERVICES | Facility: HOSPITAL | Age: 61
LOS: 1 days | Discharge: HOME | End: 2021-09-20

## 2021-09-20 DIAGNOSIS — C50.912 MALIGNANT NEOPLASM OF UNSPECIFIED SITE OF LEFT FEMALE BREAST: ICD-10-CM

## 2021-09-21 ENCOUNTER — APPOINTMENT (OUTPATIENT)
Age: 61
End: 2021-09-21
Payer: COMMERCIAL

## 2021-09-21 ENCOUNTER — RESULT REVIEW (OUTPATIENT)
Age: 61
End: 2021-09-21

## 2021-09-21 VITALS
OXYGEN SATURATION: 99 % | HEART RATE: 67 BPM | TEMPERATURE: 97.1 F | SYSTOLIC BLOOD PRESSURE: 135 MMHG | HEIGHT: 62.6 IN | WEIGHT: 177.91 LBS | DIASTOLIC BLOOD PRESSURE: 89 MMHG | BODY MASS INDEX: 31.92 KG/M2 | RESPIRATION RATE: 16 BRPM

## 2021-09-21 LAB
BASOPHILS # BLD AUTO: 0.03 K/UL — SIGNIFICANT CHANGE UP (ref 0–0.2)
BASOPHILS NFR BLD AUTO: 0.5 % — SIGNIFICANT CHANGE UP (ref 0–2)
EOSINOPHIL # BLD AUTO: 0.21 K/UL — SIGNIFICANT CHANGE UP (ref 0–0.5)
EOSINOPHIL NFR BLD AUTO: 3.6 % — SIGNIFICANT CHANGE UP (ref 0–6)
HCT VFR BLD CALC: 38.5 % — SIGNIFICANT CHANGE UP (ref 34.5–45)
HGB BLD-MCNC: 12.4 G/DL — SIGNIFICANT CHANGE UP (ref 11.5–15.5)
IMM GRANULOCYTES NFR BLD AUTO: 0.3 % — SIGNIFICANT CHANGE UP (ref 0–1.5)
LYMPHOCYTES # BLD AUTO: 1.45 K/UL — SIGNIFICANT CHANGE UP (ref 1–3.3)
LYMPHOCYTES # BLD AUTO: 24.7 % — SIGNIFICANT CHANGE UP (ref 13–44)
MCHC RBC-ENTMCNC: 28.2 PG — SIGNIFICANT CHANGE UP (ref 27–34)
MCHC RBC-ENTMCNC: 32.2 G/DL — SIGNIFICANT CHANGE UP (ref 32–36)
MCV RBC AUTO: 87.7 FL — SIGNIFICANT CHANGE UP (ref 80–100)
MONOCYTES # BLD AUTO: 0.51 K/UL — SIGNIFICANT CHANGE UP (ref 0–0.9)
MONOCYTES NFR BLD AUTO: 8.7 % — SIGNIFICANT CHANGE UP (ref 2–14)
NEUTROPHILS # BLD AUTO: 3.65 K/UL — SIGNIFICANT CHANGE UP (ref 1.8–7.4)
NEUTROPHILS NFR BLD AUTO: 62.2 % — SIGNIFICANT CHANGE UP (ref 43–77)
NRBC # BLD: 0 /100 WBCS — SIGNIFICANT CHANGE UP (ref 0–0)
PLATELET # BLD AUTO: 199 K/UL — SIGNIFICANT CHANGE UP (ref 150–400)
RBC # BLD: 4.39 M/UL — SIGNIFICANT CHANGE UP (ref 3.8–5.2)
RBC # FLD: 14.6 % — HIGH (ref 10.3–14.5)
WBC # BLD: 5.87 K/UL — SIGNIFICANT CHANGE UP (ref 3.8–10.5)
WBC # FLD AUTO: 5.87 K/UL — SIGNIFICANT CHANGE UP (ref 3.8–10.5)

## 2021-09-21 PROCEDURE — 99214 OFFICE O/P EST MOD 30 MIN: CPT

## 2021-09-21 NOTE — ASSESSMENT
[FreeTextEntry1] : Ms. TOVAR 's questions were answered to her satisfaction. She expressed her understanding and willingness to comply with the above recommendations, and  will return to the office in 6 months.\par \par

## 2021-09-21 NOTE — HISTORY OF PRESENT ILLNESS
[de-identified] : 61 F, postmenopausal, of [place] origin, with ER+, Her 2 negative, stage II A ( T1c, N1a)  infiltrating ductal carcinoma diagnosed in December 2014.\par \par CASE SYNOPSIS:\par 8/2014 – mammogram at age 54 –abnormality in left breast UOQ, previously biopsied. \par \par 11/5/14 – core biopsy confirms malignancy .\par \par 12/10/14 – left lumpectomy; pathology 1.8 x 1.6 x 1.0 cm infiltrating ductal carcinoma with focal DCIS, LVI+, 1/1 SLN positive with 2.5 mm micrometastasis.\par \par 2/10/15 – started Tamoxifen (patient premenopausal at the time). \par \par 4/13/15 – completed XRT.\par \par 7/20/18 – mammogram/ sonogram: no evidence of disease.\par \par 3/6/19- mammogram/ breast US: no mammographic evidence of malignancy; stable hypoechoic nodule 1:00 right breast (previously biopsied).\par \par 6/11/19- D&C- endometrial  hyperplasia; + endometrial polyps x 3; no malignancy.\par \par 1/24/20- D& C; fragments of endometrial polyp in a background of weakly proliferative endometrium.\par \par 3/1/20- Plan to discontinue tamoxifen and switch to letrozole 2.5 mg daily.\par \par 3/5/2021: Mammogram: Marked postlumpectomy changes in the left upper outer quadrant.  The lumpectomy site associated with additional questionable calcification on spot magnification radiographs were several circumferential new coarse calcifications are noted. Spot magnification radiographs are obtained of calcifications in the central inner left breast which are predominantly curvilinear.\par \par 3/5/2021: Breast ultrasound: Probable benign calcifications at the lumpectomy site as well as in the left central inner breast, based on fat necrosis.  Follow-up diagnostic left mammogram in 6-month recommended.\par \par 9/10/2021: Mammogram left breast–stable, benign-appearing calcification in the left breast.  Annual follow-up recommended.\par  [FreeTextEntry1] : Tamoxifen- to be discontinued 2/29/20\par Letrozole 2.5 mg daily- to start March 1, 2020. [de-identified] : Ms. TOVAR is returning for medical oncology follow-up after 9/10/2021 mammogram showed a stable benign-appearing calcification in the left breast.  This was done in follow-up of mammogram/breast ultrasound from March 2021 that showed probable benign calcification at the lumpectomy site and in the left central inner breast.  Patient is doing well, compliant with adjuvant hormonal therapy (letrozole) despite persistent arthritic pain.  Medication list includes NSAIDs.  Clinical examination unchanged although patient continues to complain of arthritic pain with weightbearing, mostly in both knees.  Had recent left heart catheterization status post positive stress echocardiogram; LHC demonstrated nonobstructive CAD with 30% stenosis of proximal LAD.  Recommended atorvastatin, diet and lifestyle modification, and lisinopril for better blood pressure control by cardiology.  Patient has not started atorvastatin or lisinopril yet.  She is presently unemployed.\par \par

## 2021-09-21 NOTE — PHYSICAL EXAM
[Fully active, able to carry on all pre-disease performance without restriction] : Status 0 - Fully active, able to carry on all pre-disease performance without restriction [Normal] : full range of motion and no deformities appreciated [de-identified] : left breast lumpectomy scar well healed. No masses, no nipple d/c bilaterally.

## 2021-09-22 LAB
25(OH)D3 SERPL-MCNC: 50.7 NG/ML
ALBUMIN SERPL ELPH-MCNC: 4.4 G/DL
ALP BLD-CCNC: 110 U/L
ALT SERPL-CCNC: 12 U/L
ANION GAP SERPL CALC-SCNC: 10 MMOL/L
AST SERPL-CCNC: 17 U/L
BILIRUB SERPL-MCNC: 0.2 MG/DL
BUN SERPL-MCNC: 16 MG/DL
CALCIUM SERPL-MCNC: 9.7 MG/DL
CANCER AG27-29 SERPL-ACNC: 20.3 U/ML
CHLORIDE SERPL-SCNC: 104 MMOL/L
CO2 SERPL-SCNC: 25 MMOL/L
CREAT SERPL-MCNC: 0.68 MG/DL
GLUCOSE SERPL-MCNC: 96 MG/DL
POTASSIUM SERPL-SCNC: 4.6 MMOL/L
PROT SERPL-MCNC: 6.7 G/DL
SODIUM SERPL-SCNC: 139 MMOL/L

## 2021-10-05 ENCOUNTER — RESULT REVIEW (OUTPATIENT)
Age: 61
End: 2021-10-05

## 2021-10-05 ENCOUNTER — APPOINTMENT (OUTPATIENT)
Dept: BREAST CENTER | Facility: CLINIC | Age: 61
End: 2021-10-05
Payer: COMMERCIAL

## 2021-10-05 VITALS — SYSTOLIC BLOOD PRESSURE: 130 MMHG | HEART RATE: 72 BPM | DIASTOLIC BLOOD PRESSURE: 78 MMHG

## 2021-10-05 VITALS — HEIGHT: 62.6 IN | WEIGHT: 177 LBS | BODY MASS INDEX: 31.76 KG/M2

## 2021-10-05 DIAGNOSIS — Z12.39 ENCOUNTER FOR OTHER SCREENING FOR MALIGNANT NEOPLASM OF BREAST: ICD-10-CM

## 2021-10-05 PROCEDURE — 99214 OFFICE O/P EST MOD 30 MIN: CPT

## 2021-10-05 NOTE — PHYSICAL EXAM
[Normocephalic] : normocephalic [Atraumatic] : atraumatic [EOMI] : extra ocular movement intact [Sclera nonicteric] : sclera nonicteric [Supple] : supple [No Supraclavicular Adenopathy] : no supraclavicular adenopathy [No Cervical Adenopathy] : no cervical adenopathy [Clear to Auscultation Bilat] : clear to auscultation bilaterally [Normal Sinus Rhythm] : normal sinus rhythm [No Rubs] : no pericardial rub [Examined in the supine and seated position] : examined in the supine and seated position [Symmetrical] : symmetrical [Grade 3] : Ptosis Grade 3 [No dominant masses] : no dominant masses in right breast  [No dominant masses] : no dominant masses left breast [No Nipple Retraction] : no left nipple retraction [No Nipple Discharge] : no left nipple discharge [No Axillary Lymphadenopathy] : no left axillary lymphadenopathy [Soft] : abdomen soft [Not Tender] : non-tender [No Edema] : no edema [No Swelling] : no swelling [No Rashes] : no rashes [No Ulceration] : no ulceration [de-identified] : Superior transverse scar [de-identified] : Transverse scar

## 2021-10-05 NOTE — DATA REVIEWED
[FreeTextEntry1] : Left stereotactic biopsy:  1/12/18   Pathology:  Leiomyoma left breast.\par \par B/l mammogram and complete US 3/5/21\par - scattered fibroglandular density\par - probable benign calcifications at lumpectomy site in L central inner breast; probable fat necrosis; 6 mo L mammo\par - BIRADS 3\par \par L mammogram 9/10/21\par - stable calcifications in L lumpectomy site\par - BIRADS 2\par

## 2021-10-05 NOTE — HISTORY OF PRESENT ILLNESS
[FreeTextEntry1] : Ms. Simpson is a 61 year old woman here for a follow-up for surveillance for breast cancer and follow-up of left breast calcifications. Her breast history is significant for\par \par 1.) Left breast infiltrating ductal carcinoma diagnosed in 2014 at age 54, pathologic stage II, pT1 pN1, ER 85%, MO 95%, Her2 0-1, SBR 5-6/9\par - s/p L lumpectomy, sentinel node biopsy (Dr. Alexander, 12/10/14) = 1.8 cm tumor, 1/1 SLN with 0.25 cm metastasis\par - s/p radiation (Dr. Taylor)\par - on tamoxifen then letrozole (Dr. Schneider)\par \par No breast complaints. No palpable breast or axillary lumps, nipple discharge, or skin changes. She has gained some weight and is working on diet and exercise for her cholesterol and high blood pressure. \par \par Her genetic panel testing is negative. Her family history is significant for breast cancer in her mother.

## 2021-10-05 NOTE — CONSULT LETTER
[Dear  ___] : Dear  [unfilled], [Courtesy Letter:] : I had the pleasure of seeing your patient, [unfilled], in my office today. [Please see my note below.] : Please see my note below. [Consult Closing:] : Thank you very much for allowing me to participate in the care of this patient.  If you have any questions, please do not hesitate to contact me. [Sincerely,] : Sincerely, [FreeTextEntry3] : Symone Nichols MD FACS  [DrReese  ___] : Dr. HARRIS [DrReese ___] : Dr. HARRIS

## 2022-03-07 ENCOUNTER — RX RENEWAL (OUTPATIENT)
Age: 62
End: 2022-03-07

## 2022-03-11 ENCOUNTER — RESULT REVIEW (OUTPATIENT)
Age: 62
End: 2022-03-11

## 2022-03-11 ENCOUNTER — APPOINTMENT (OUTPATIENT)
Dept: ULTRASOUND IMAGING | Facility: CLINIC | Age: 62
End: 2022-03-11
Payer: COMMERCIAL

## 2022-03-11 ENCOUNTER — OUTPATIENT (OUTPATIENT)
Dept: OUTPATIENT SERVICES | Facility: HOSPITAL | Age: 62
LOS: 1 days | End: 2022-03-11
Payer: COMMERCIAL

## 2022-03-11 ENCOUNTER — APPOINTMENT (OUTPATIENT)
Dept: MAMMOGRAPHY | Facility: CLINIC | Age: 62
End: 2022-03-11
Payer: COMMERCIAL

## 2022-03-11 DIAGNOSIS — Z00.8 ENCOUNTER FOR OTHER GENERAL EXAMINATION: ICD-10-CM

## 2022-03-11 DIAGNOSIS — Z12.39 ENCOUNTER FOR OTHER SCREENING FOR MALIGNANT NEOPLASM OF BREAST: ICD-10-CM

## 2022-03-11 PROCEDURE — 76641 ULTRASOUND BREAST COMPLETE: CPT

## 2022-03-11 PROCEDURE — 77065 DX MAMMO INCL CAD UNI: CPT | Mod: 26,GG,LT

## 2022-03-11 PROCEDURE — 76641 ULTRASOUND BREAST COMPLETE: CPT | Mod: 26,50

## 2022-03-11 PROCEDURE — 77063 BREAST TOMOSYNTHESIS BI: CPT | Mod: 26,59

## 2022-03-11 PROCEDURE — 77067 SCR MAMMO BI INCL CAD: CPT | Mod: 26,59

## 2022-03-11 PROCEDURE — 77065 DX MAMMO INCL CAD UNI: CPT

## 2022-03-11 PROCEDURE — 77063 BREAST TOMOSYNTHESIS BI: CPT

## 2022-03-11 PROCEDURE — 77067 SCR MAMMO BI INCL CAD: CPT

## 2022-03-16 ENCOUNTER — OUTPATIENT (OUTPATIENT)
Dept: OUTPATIENT SERVICES | Facility: HOSPITAL | Age: 62
LOS: 1 days | Discharge: HOME | End: 2022-03-16

## 2022-03-16 DIAGNOSIS — C50.912 MALIGNANT NEOPLASM OF UNSPECIFIED SITE OF LEFT FEMALE BREAST: ICD-10-CM

## 2022-03-18 ENCOUNTER — APPOINTMENT (OUTPATIENT)
Age: 62
End: 2022-03-18
Payer: COMMERCIAL

## 2022-03-18 ENCOUNTER — RESULT REVIEW (OUTPATIENT)
Age: 62
End: 2022-03-18

## 2022-03-18 VITALS
TEMPERATURE: 97 F | OXYGEN SATURATION: 98 % | RESPIRATION RATE: 15 BRPM | HEART RATE: 61 BPM | SYSTOLIC BLOOD PRESSURE: 112 MMHG | BODY MASS INDEX: 31.84 KG/M2 | WEIGHT: 177.47 LBS | DIASTOLIC BLOOD PRESSURE: 75 MMHG

## 2022-03-18 LAB
25(OH)D3 SERPL-MCNC: 52.4 NG/ML
ALBUMIN SERPL ELPH-MCNC: 4.7 G/DL
ALP BLD-CCNC: 93 U/L
ALT SERPL-CCNC: 10 U/L
ANION GAP SERPL CALC-SCNC: 12 MMOL/L
AST SERPL-CCNC: 12 U/L
BASOPHILS # BLD AUTO: 0.04 K/UL — SIGNIFICANT CHANGE UP (ref 0–0.2)
BASOPHILS NFR BLD AUTO: 0.6 % — SIGNIFICANT CHANGE UP (ref 0–2)
BILIRUB SERPL-MCNC: 0.2 MG/DL
BUN SERPL-MCNC: 13 MG/DL
CALCIUM SERPL-MCNC: 10.4 MG/DL
CHLORIDE SERPL-SCNC: 105 MMOL/L
CO2 SERPL-SCNC: 24 MMOL/L
CREAT SERPL-MCNC: 0.69 MG/DL
EGFR: 100 ML/MIN/1.73M2
EOSINOPHIL # BLD AUTO: 0.16 K/UL — SIGNIFICANT CHANGE UP (ref 0–0.5)
EOSINOPHIL NFR BLD AUTO: 2.4 % — SIGNIFICANT CHANGE UP (ref 0–6)
GLUCOSE SERPL-MCNC: 90 MG/DL
HCT VFR BLD CALC: 39.7 % — SIGNIFICANT CHANGE UP (ref 34.5–45)
HGB BLD-MCNC: 12.9 G/DL — SIGNIFICANT CHANGE UP (ref 11.5–15.5)
IMM GRANULOCYTES NFR BLD AUTO: 0.3 % — SIGNIFICANT CHANGE UP (ref 0–1.5)
LYMPHOCYTES # BLD AUTO: 1.49 K/UL — SIGNIFICANT CHANGE UP (ref 1–3.3)
LYMPHOCYTES # BLD AUTO: 22.6 % — SIGNIFICANT CHANGE UP (ref 13–44)
MCHC RBC-ENTMCNC: 28.4 PG — SIGNIFICANT CHANGE UP (ref 27–34)
MCHC RBC-ENTMCNC: 32.5 G/DL — SIGNIFICANT CHANGE UP (ref 32–36)
MCV RBC AUTO: 87.4 FL — SIGNIFICANT CHANGE UP (ref 80–100)
MONOCYTES # BLD AUTO: 0.61 K/UL — SIGNIFICANT CHANGE UP (ref 0–0.9)
MONOCYTES NFR BLD AUTO: 9.3 % — SIGNIFICANT CHANGE UP (ref 2–14)
NEUTROPHILS # BLD AUTO: 4.27 K/UL — SIGNIFICANT CHANGE UP (ref 1.8–7.4)
NEUTROPHILS NFR BLD AUTO: 64.8 % — SIGNIFICANT CHANGE UP (ref 43–77)
NRBC # BLD: 0 /100 WBCS — SIGNIFICANT CHANGE UP (ref 0–0)
PLATELET # BLD AUTO: 208 K/UL — SIGNIFICANT CHANGE UP (ref 150–400)
POTASSIUM SERPL-SCNC: 4.7 MMOL/L
PROT SERPL-MCNC: 6.8 G/DL
RBC # BLD: 4.54 M/UL — SIGNIFICANT CHANGE UP (ref 3.8–5.2)
RBC # FLD: 14.3 % — SIGNIFICANT CHANGE UP (ref 10.3–14.5)
SODIUM SERPL-SCNC: 141 MMOL/L
WBC # BLD: 6.59 K/UL — SIGNIFICANT CHANGE UP (ref 3.8–10.5)
WBC # FLD AUTO: 6.59 K/UL — SIGNIFICANT CHANGE UP (ref 3.8–10.5)

## 2022-03-18 PROCEDURE — 99213 OFFICE O/P EST LOW 20 MIN: CPT

## 2022-03-19 NOTE — PHYSICAL EXAM
[Fully active, able to carry on all pre-disease performance without restriction] : Status 0 - Fully active, able to carry on all pre-disease performance without restriction [Normal] : full range of motion and no deformities appreciated [de-identified] : left breast lumpectomy scar well healed. No masses, no nipple d/c bilaterally.

## 2022-03-19 NOTE — HISTORY OF PRESENT ILLNESS
[de-identified] : 61 F, postmenopausal, of Tel[place] origin, with ER+, Her 2 negative, stage II A ( T1c, N1a)  infiltrating ductal carcinoma diagnosed in December 2014.\par \par CASE SYNOPSIS:\par 8/2014 – mammogram at age 54 –abnormality in left breast UOQ, previously biopsied. \par \par 11/5/14 – core biopsy confirms malignancy .\par \par 12/10/14 – left lumpectomy; pathology 1.8 x 1.6 x 1.0 cm infiltrating ductal carcinoma with focal DCIS, LVI+, 1/1 SLN positive with 2.5 mm micrometastasis.\par \par 2/10/15 – started Tamoxifen (patient premenopausal at the time). \par \par 4/13/15 – completed XRT.\par \par 7/20/18 – mammogram/ sonogram: no evidence of disease.\par \par 3/6/19- mammogram/ breast US: no mammographic evidence of malignancy; stable hypoechoic nodule 1:00 right breast (previously biopsied).\par \par 6/11/19- D&C- endometrial  hyperplasia; + endometrial polyps x 3; no malignancy.\par \par 1/24/20- D& C; fragments of endometrial polyp in a background of weakly proliferative endometrium.\par \par 3/1/20- Plan to discontinue tamoxifen and switch to letrozole 2.5 mg daily.\par \par 3/5/2021: Mammogram: Marked postlumpectomy changes in the left upper outer quadrant.  The lumpectomy site associated with additional questionable calcification on spot magnification radiographs were several circumferential new coarse calcifications are noted. Spot magnification radiographs are obtained of calcifications in the central inner left breast which are predominantly curvilinear.\par \par 3/5/2021: Breast ultrasound: Probable benign calcifications at the lumpectomy site as well as in the left central inner breast, based on fat necrosis.  Follow-up diagnostic left mammogram in 6-month recommended.\par \par 9/10/2021: Mammogram left breast–stable, benign-appearing calcification in the left breast.  Annual follow-up recommended.\par \par 3/14/2022 mammogram/bilateral breast ultrasound–no mammographic or sonographic evidence of malignancy\par  [FreeTextEntry1] : Tamoxifen-  discontinued 2/29/20\par Letrozole 2.5 mg daily- to start March 1, 2020. [de-identified] : Here for surveillance of breast cancer; last seen in office in September 2021.  She had restaging mammogram/bilateral breast ultrasound this month in follow-up of 9/10/2021 mammogram that showed benign-appearing calcification in the left breast.  Patient's clinical status unchanged, and she continues adjuvant endocrine therapy with letrozole.  She is complaining of chronic arthritis for which she is taking NSAIDs.  No new cardiac events; compliant with atorvastatin.  Follows up with cardiology and blood pressure is under control.  Denies recent infections or hospitalizations.  Recently employed.\par \par \par

## 2022-03-28 LAB — CANCER AG27-29 SERPL-ACNC: 18.2 U/ML

## 2022-04-20 ENCOUNTER — NON-APPOINTMENT (OUTPATIENT)
Age: 62
End: 2022-04-20

## 2022-04-20 ENCOUNTER — APPOINTMENT (OUTPATIENT)
Dept: CARDIOLOGY | Facility: CLINIC | Age: 62
End: 2022-04-20
Payer: COMMERCIAL

## 2022-04-20 VITALS
DIASTOLIC BLOOD PRESSURE: 80 MMHG | RESPIRATION RATE: 16 BRPM | HEART RATE: 61 BPM | BODY MASS INDEX: 32.12 KG/M2 | HEIGHT: 62.6 IN | WEIGHT: 179 LBS | SYSTOLIC BLOOD PRESSURE: 122 MMHG

## 2022-04-20 PROCEDURE — 93000 ELECTROCARDIOGRAM COMPLETE: CPT

## 2022-04-20 PROCEDURE — 99214 OFFICE O/P EST MOD 30 MIN: CPT

## 2022-04-20 RX ORDER — LISINOPRIL 20 MG/1
20 TABLET ORAL DAILY
Qty: 90 | Refills: 0 | Status: DISCONTINUED | COMMUNITY
Start: 2021-09-14 | End: 2022-04-20

## 2022-04-20 NOTE — HISTORY OF PRESENT ILLNESS
[FreeTextEntry1] : She states that there was a delay on her taking the prescribed lisinopril 20 mg daily and atorvastatin 10 mg daily.  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

## 2022-04-20 NOTE — REASON FOR VISIT
[Arrhythmia/ECG Abnorrmalities] : arrhythmia/ECG abnormalities [Structural Heart and Valve Disease] : structural heart and valve disease [Hyperlipidemia] : hyperlipidemia [Hypertension] : hypertension [FreeTextEntry3] : BJ Koenig [FreeTextEntry1] : The patient is a 61-year-old white female who has a history for mild/subcritical CAD diagnosed a cardiac catheterization August 30, 2021.(30% plaque in proximal LAD);\par \par She has been managed for history of mild hypertension and hyperlipidemia;\par \par The patient presents back to the office today for general cardiac checkup;\par \par Overall, patient reports that she has been generally feeling well and has had no significant chest pain, shortness of breath, palpitations or dizziness;

## 2022-04-20 NOTE — ASSESSMENT
[FreeTextEntry1] : EKG shows normal sinus rhythm at a rate of 61;  Poor R-wave progression V2-4; Slight left axis deviation. General low-voltage tracing;\par \par In summary, this 61-year-old woman has a history for mild plaquing CADdiagnosed a cardiac catheterization August 30, 2021. She has been largely asymptomatic from a cardiac standpoint with the exception of possible side effect of cough-throat tickle- from ACE inhibitor (lisinopril);\par \par \par Plan:\par \par Patient recommended to discontinue lisinopril and start valsartan 160 mg p.o. daily;\par \par Continue other medications the same;\par \par Recommend her regular checkups and laboratory blood tests and lipid panel with primary care;\par \par Return to office within 5 months or p.r.n.;\par \par No additional cardiac workup indicated at this time;\par \par Discussed moderate physical activity and exercise regimen;\par

## 2022-08-23 ENCOUNTER — NON-APPOINTMENT (OUTPATIENT)
Age: 62
End: 2022-08-23

## 2022-08-24 ENCOUNTER — APPOINTMENT (OUTPATIENT)
Dept: BREAST CENTER | Facility: CLINIC | Age: 62
End: 2022-08-24

## 2022-08-24 VITALS
BODY MASS INDEX: 31.76 KG/M2 | HEIGHT: 62.6 IN | DIASTOLIC BLOOD PRESSURE: 89 MMHG | WEIGHT: 177 LBS | HEART RATE: 63 BPM | SYSTOLIC BLOOD PRESSURE: 133 MMHG

## 2022-08-24 DIAGNOSIS — R92.0 MAMMOGRAPHIC MICROCALCIFICATION FOUND ON DIAGNOSTIC IMAGING OF BREAST: ICD-10-CM

## 2022-08-24 DIAGNOSIS — R92.2 INCONCLUSIVE MAMMOGRAM: ICD-10-CM

## 2022-08-24 PROCEDURE — 99214 OFFICE O/P EST MOD 30 MIN: CPT

## 2022-08-24 RX ORDER — ATORVASTATIN CALCIUM 10 MG/1
10 TABLET, FILM COATED ORAL
Qty: 90 | Refills: 1 | Status: DISCONTINUED | COMMUNITY
Start: 2021-09-14 | End: 2022-08-24

## 2022-08-24 NOTE — HISTORY OF PRESENT ILLNESS
[FreeTextEntry1] : Ms. Simpson is a 62 year old woman here for a follow-up for surveillance for breast cancer and follow-up of left breast calcifications. Her breast history is significant for\par \par 1.) Left breast infiltrating ductal carcinoma diagnosed in 2014 at age 54, pathologic stage II, pT1 pN1, ER 85%, GA 95%, Her2 0-1, SBR 5-6/9\par - s/p L lumpectomy, sentinel node biopsy (Dr. Alexander, 12/10/14) = 1.8 cm tumor, 1/1 SLN with 0.25 cm metastasis\par - s/p radiation (Dr. Taylor)\par - on tamoxifen then letrozole (Dr. Schneider)\par \par She is doing well. No lumps. She is working on losing some weight. She will be moving her son to college tomorrow.\par \par Her genetic panel testing is negative. Her family history is significant for breast cancer in her mother.

## 2022-08-24 NOTE — DATA REVIEWED
[FreeTextEntry1] : Left stereotactic biopsy:  1/12/18   Pathology:  Leiomyoma left breast.\par \par B/l mammogram and complete US 3/5/21\par - scattered fibroglandular density\par - probable benign calcifications at lumpectomy site in L central inner breast; probable fat necrosis; 6 mo L mammo\par - BIRADS 3\par \par L mammogram 9/10/21\par - stable calcifications in L lumpectomy site\par \par B/l mammogram and US 3/11/22\par - scattered fibroglandular density\par - development and coarsening of calcifications by L lumpectomy site with appearance of fat necrosis\par - BIRADS 2\par - BIRADS 2\par

## 2022-08-24 NOTE — PHYSICAL EXAM
[Normocephalic] : normocephalic [Atraumatic] : atraumatic [Sclera nonicteric] : sclera nonicteric [Supple] : supple [No Supraclavicular Adenopathy] : no supraclavicular adenopathy [No Cervical Adenopathy] : no cervical adenopathy [Clear to Auscultation Bilat] : clear to auscultation bilaterally [Normal Sinus Rhythm] : normal sinus rhythm [No Rubs] : no pericardial rub [Examined in the supine and seated position] : examined in the supine and seated position [Symmetrical] : symmetrical [Grade 3] : Ptosis Grade 3 [No dominant masses] : no dominant masses in right breast  [No dominant masses] : no dominant masses left breast [No Nipple Retraction] : no left nipple retraction [No Nipple Discharge] : no left nipple discharge [No Axillary Lymphadenopathy] : no left axillary lymphadenopathy [Soft] : abdomen soft [Not Tender] : non-tender [No Edema] : no edema [No Swelling] : no swelling [No Rashes] : no rashes [No Ulceration] : no ulceration [de-identified] : Superior transverse scar [de-identified] : Transverse scar

## 2022-08-24 NOTE — CONSULT LETTER
[Dear  ___] : Dear  [unfilled], [Courtesy Letter:] : I had the pleasure of seeing your patient, [unfilled], in my office today. [Please see my note below.] : Please see my note below. [Consult Closing:] : Thank you very much for allowing me to participate in the care of this patient.  If you have any questions, please do not hesitate to contact me. [Sincerely,] : Sincerely, [FreeTextEntry3] : Symone Nichols MD FACS

## 2022-09-14 ENCOUNTER — RX RENEWAL (OUTPATIENT)
Age: 62
End: 2022-09-14

## 2022-09-20 ENCOUNTER — OUTPATIENT (OUTPATIENT)
Dept: OUTPATIENT SERVICES | Facility: HOSPITAL | Age: 62
LOS: 1 days | Discharge: HOME | End: 2022-09-20

## 2022-09-20 DIAGNOSIS — C50.912 MALIGNANT NEOPLASM OF UNSPECIFIED SITE OF LEFT FEMALE BREAST: ICD-10-CM

## 2022-09-23 ENCOUNTER — APPOINTMENT (OUTPATIENT)
Age: 62
End: 2022-09-23

## 2022-09-23 VITALS
HEART RATE: 66 BPM | OXYGEN SATURATION: 97 % | RESPIRATION RATE: 16 BRPM | TEMPERATURE: 97.9 F | WEIGHT: 181.88 LBS | BODY MASS INDEX: 32.63 KG/M2 | SYSTOLIC BLOOD PRESSURE: 130 MMHG | DIASTOLIC BLOOD PRESSURE: 80 MMHG

## 2022-09-23 PROCEDURE — 99214 OFFICE O/P EST MOD 30 MIN: CPT

## 2022-09-24 NOTE — PHYSICAL EXAM
[Fully active, able to carry on all pre-disease performance without restriction] : Status 0 - Fully active, able to carry on all pre-disease performance without restriction [Normal] : full range of motion and no deformities appreciated [de-identified] : left breast lumpectomy scar well healed. No masses, no nipple d/c bilaterally. [de-identified] : Subcutaneous cysts left abdominal flank and under the left breast

## 2022-09-24 NOTE — HISTORY OF PRESENT ILLNESS
[Disease: _____________________] : Disease: [unfilled] [de-identified] : 62 F, postmenopausal, of [place] origin, with ER+, Her 2 negative, stage II A ( T1c, N1a)  infiltrating ductal carcinoma diagnosed in December 2014.\par \par CASE SYNOPSIS:\par 8/2014 – mammogram at age 54 –abnormality in left breast UOQ, previously biopsied. \par \par 11/5/14 – core biopsy confirms malignancy .\par \par 12/10/14 – left lumpectomy; pathology 1.8 x 1.6 x 1.0 cm infiltrating ductal carcinoma with focal DCIS, LVI+, 1/1 SLN positive with 2.5 mm micrometastasis.\par \par 2/10/15 – started Tamoxifen (patient premenopausal at the time). \par \par 4/13/15 – completed XRT.\par \par 7/20/18 – mammogram/ sonogram: no evidence of disease.\par \par 3/6/19- mammogram/ breast US: no mammographic evidence of malignancy; stable hypoechoic nodule 1:00 right breast (previously biopsied).\par \par 6/11/19- D&C- endometrial  hyperplasia; + endometrial polyps x 3; no malignancy.\par \par 1/24/20- D& C; fragments of endometrial polyp in a background of weakly proliferative endometrium.\par \par 3/1/20- Plan to discontinue tamoxifen and switch to letrozole 2.5 mg daily.\par \par 3/5/2021: Mammogram: Marked postlumpectomy changes in the left upper outer quadrant.  The lumpectomy site associated with additional questionable calcification on spot magnification radiographs were several circumferential new coarse calcifications are noted. Spot magnification radiographs are obtained of calcifications in the central inner left breast which are predominantly curvilinear.\par \par 3/5/2021: Breast ultrasound: Probable benign calcifications at the lumpectomy site as well as in the left central inner breast, based on fat necrosis.  Follow-up diagnostic left mammogram in 6-month recommended.\par \par 9/10/2021: Mammogram left breast–stable, benign-appearing calcification in the left breast.  Annual follow-up recommended.\par \par 3/14/2022 mammogram/bilateral breast ultrasound–no mammographic or sonographic evidence of malignancy\par  [de-identified] : infiltrating ductal carcinoma [FreeTextEntry1] : Tamoxifen-  discontinued 2/29/20\par Letrozole 2.5 mg daily- to start March 1, 2020. [de-identified] : Biannual oncologic follow-up.  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

## 2022-11-14 ENCOUNTER — RESULT CHARGE (OUTPATIENT)
Age: 62
End: 2022-11-14

## 2022-11-15 ENCOUNTER — NON-APPOINTMENT (OUTPATIENT)
Age: 62
End: 2022-11-15

## 2022-11-15 ENCOUNTER — APPOINTMENT (OUTPATIENT)
Dept: CARDIOLOGY | Facility: CLINIC | Age: 62
End: 2022-11-15

## 2022-11-15 VITALS
HEART RATE: 64 BPM | HEIGHT: 62.6 IN | RESPIRATION RATE: 16 BRPM | WEIGHT: 181 LBS | BODY MASS INDEX: 32.47 KG/M2 | SYSTOLIC BLOOD PRESSURE: 122 MMHG | DIASTOLIC BLOOD PRESSURE: 88 MMHG

## 2022-11-15 DIAGNOSIS — R09.89 OTHER SPECIFIED SYMPTOMS AND SIGNS INVOLVING THE CIRCULATORY AND RESPIRATORY SYSTEMS: ICD-10-CM

## 2022-11-15 PROCEDURE — 99214 OFFICE O/P EST MOD 30 MIN: CPT | Mod: 25

## 2022-11-15 PROCEDURE — 93000 ELECTROCARDIOGRAM COMPLETE: CPT

## 2022-11-15 NOTE — PHYSICAL EXAM
[No Acute Distress] : no acute distress [Obese] : obese [Normal Conjunctiva] : normal conjunctiva [Normal Venous Pressure] : normal venous pressure [Carotid Bruit] : carotid bruit [Normal S1, S2] : normal S1, S2 [No Murmur] : no murmur [No Rub] : no rub [No Gallop] : no gallop [Clear Lung Fields] : clear lung fields [Good Air Entry] : good air entry [No Respiratory Distress] : no respiratory distress  [Soft] : abdomen soft [Non Tender] : non-tender [No Masses/organomegaly] : no masses/organomegaly [Normal Gait] : normal gait [No Edema] : no edema [No Cyanosis] : no cyanosis [No Clubbing] : no clubbing [No Rash] : no rash [No Skin Lesions] : no skin lesions [Moves all extremities] : moves all extremities [No Focal Deficits] : no focal deficits [Normal Speech] : normal speech [Alert and Oriented] : alert and oriented [Normal memory] : normal memory [Appears Anxious] : appears anxious [de-identified] : left

## 2022-11-15 NOTE — REASON FOR VISIT
[FreeTextEntry1] : EVERETT TOVAR is being seen for arrhythmia/ECG abnormalities, structural heart and valve disease, hyperlipidemia and hypertension. \par \par The patient is a 62-year-old white female who has a history for mild/subcritical CAD diagnosed a cardiac catheterization August 30, 2021.(30% plaque in proximal LAD);\par \par She has been managed for history of mild hypertension and hyperlipidemia, unfortunately she has been intolerant to Atorvastatin in the past and is currently not on anti-lipid medication;\par \par The patient presents back to the office today for general cardiac checkup;\par \par Overall, patient reports that she has been generally feeling well and has had no significant chest pain, shortness of breath, palpitations or dizziness;

## 2022-11-15 NOTE — ASSESSMENT
[FreeTextEntry1] : EKG shows normal sinus rhythm at a rate of 64; Poor R-wave progression across precordial leads; left axis deviation; nonspecific T wave abnormality; General low-voltage tracing;\par \par In summary, this 62-year-old woman has a history for mild plaquing CAD diagnosed a cardiac catheterization August 30, 2021. She has been largely asymptomatic from a cardiac standpoint with the exception of possible side effect of cough-throat tickle- from ACE inhibitor (lisinopril);\par Also has history for side effects from Atorvastatin 10 mg with leg cramping / discomfort - not current on anti-lipid therapy.  Most recent fasting labs demonstrates LDL (125);\par Patient admits to being non-compliant with adhering to low fat diet and exercising regularly.\par \par \par Plan:\par \par Patient recommended to continue with valsartan 160 mg p.o. daily;\par \par Continue other medications the same;\par \par Recommend her regular checkups and laboratory blood tests and lipid panel with primary care;\par \par Consider starting another Statin medication if LDL levels still not at goal ~ 100 ?Pravastatin ?Crestor ?Livalo;\par \par Return to office with Dr. Moreno within 5-6 months or p.r.n.;\par \par Recommend she complete a Transthoracic Echocardiogram and Carotid Doppler prior to follow up to assess overall cardiac function and valvulopathy as well as to assess extent of carotid plaquing stenosis respectively;\par \par Discussed moderate physical activity and exercise regimen;

## 2022-11-15 NOTE — HISTORY OF PRESENT ILLNESS
[FreeTextEntry1] : Unfortunately, patient began lisinopril 20 mg daily and she noticed an annoying tickle or cough-likely secondary from the lisinopril.  She was later switched to Valsartan 160 mg for which she has been tolerating without side effects;\par \par She has not been able to exercise much and her diet has been less than ideal with taking care of her mother lately.  Fortunately, her mom is now in a rehab facility and she's hopeful she'll have more time to diet and exercise.  Most recent fasting labs in July 2022 demonstrated elevated LDL level (125) with goal being approximately 100;\par \par

## 2023-03-19 ENCOUNTER — RX RENEWAL (OUTPATIENT)
Age: 63
End: 2023-03-19

## 2023-03-31 ENCOUNTER — OUTPATIENT (OUTPATIENT)
Dept: OUTPATIENT SERVICES | Facility: HOSPITAL | Age: 63
LOS: 1 days | End: 2023-03-31
Payer: COMMERCIAL

## 2023-03-31 ENCOUNTER — APPOINTMENT (OUTPATIENT)
Dept: MAMMOGRAPHY | Facility: CLINIC | Age: 63
End: 2023-03-31
Payer: COMMERCIAL

## 2023-03-31 ENCOUNTER — RESULT REVIEW (OUTPATIENT)
Age: 63
End: 2023-03-31

## 2023-03-31 ENCOUNTER — APPOINTMENT (OUTPATIENT)
Dept: ULTRASOUND IMAGING | Facility: CLINIC | Age: 63
End: 2023-03-31
Payer: COMMERCIAL

## 2023-03-31 DIAGNOSIS — Z00.8 ENCOUNTER FOR OTHER GENERAL EXAMINATION: ICD-10-CM

## 2023-03-31 DIAGNOSIS — Z85.3 PERSONAL HISTORY OF MALIGNANT NEOPLASM OF BREAST: ICD-10-CM

## 2023-03-31 PROCEDURE — 77063 BREAST TOMOSYNTHESIS BI: CPT

## 2023-03-31 PROCEDURE — 77063 BREAST TOMOSYNTHESIS BI: CPT | Mod: 26

## 2023-03-31 PROCEDURE — 77067 SCR MAMMO BI INCL CAD: CPT | Mod: 26

## 2023-03-31 PROCEDURE — 77067 SCR MAMMO BI INCL CAD: CPT

## 2023-04-10 ENCOUNTER — RX RENEWAL (OUTPATIENT)
Age: 63
End: 2023-04-10

## 2023-04-11 ENCOUNTER — OUTPATIENT (OUTPATIENT)
Dept: OUTPATIENT SERVICES | Facility: HOSPITAL | Age: 63
LOS: 1 days | Discharge: HOME | End: 2023-04-11

## 2023-04-11 DIAGNOSIS — C50.912 MALIGNANT NEOPLASM OF UNSPECIFIED SITE OF LEFT FEMALE BREAST: ICD-10-CM

## 2023-04-14 ENCOUNTER — APPOINTMENT (OUTPATIENT)
Age: 63
End: 2023-04-14
Payer: COMMERCIAL

## 2023-04-14 ENCOUNTER — RESULT REVIEW (OUTPATIENT)
Age: 63
End: 2023-04-14

## 2023-04-14 VITALS
WEIGHT: 174.36 LBS | HEIGHT: 62.2 IN | SYSTOLIC BLOOD PRESSURE: 129 MMHG | DIASTOLIC BLOOD PRESSURE: 80 MMHG | BODY MASS INDEX: 31.68 KG/M2 | TEMPERATURE: 97.9 F | HEART RATE: 68 BPM | OXYGEN SATURATION: 99 % | RESPIRATION RATE: 16 BRPM

## 2023-04-14 LAB
BASOPHILS # BLD AUTO: 0.03 K/UL — SIGNIFICANT CHANGE UP (ref 0–0.2)
BASOPHILS NFR BLD AUTO: 0.5 % — SIGNIFICANT CHANGE UP (ref 0–2)
EOSINOPHIL # BLD AUTO: 0.2 K/UL — SIGNIFICANT CHANGE UP (ref 0–0.5)
EOSINOPHIL NFR BLD AUTO: 3.1 % — SIGNIFICANT CHANGE UP (ref 0–6)
HCT VFR BLD CALC: 37.8 % — SIGNIFICANT CHANGE UP (ref 34.5–45)
HGB BLD-MCNC: 12.2 G/DL — SIGNIFICANT CHANGE UP (ref 11.5–15.5)
IMM GRANULOCYTES NFR BLD AUTO: 0.3 % — SIGNIFICANT CHANGE UP (ref 0–0.9)
LYMPHOCYTES # BLD AUTO: 1.65 K/UL — SIGNIFICANT CHANGE UP (ref 1–3.3)
LYMPHOCYTES # BLD AUTO: 25.4 % — SIGNIFICANT CHANGE UP (ref 13–44)
MCHC RBC-ENTMCNC: 27.7 PG — SIGNIFICANT CHANGE UP (ref 27–34)
MCHC RBC-ENTMCNC: 32.3 G/DL — SIGNIFICANT CHANGE UP (ref 32–36)
MCV RBC AUTO: 85.7 FL — SIGNIFICANT CHANGE UP (ref 80–100)
MONOCYTES # BLD AUTO: 0.57 K/UL — SIGNIFICANT CHANGE UP (ref 0–0.9)
MONOCYTES NFR BLD AUTO: 8.8 % — SIGNIFICANT CHANGE UP (ref 2–14)
NEUTROPHILS # BLD AUTO: 4.03 K/UL — SIGNIFICANT CHANGE UP (ref 1.8–7.4)
NEUTROPHILS NFR BLD AUTO: 61.9 % — SIGNIFICANT CHANGE UP (ref 43–77)
NRBC # BLD: 0 /100 WBCS — SIGNIFICANT CHANGE UP (ref 0–0)
PLATELET # BLD AUTO: 210 K/UL — SIGNIFICANT CHANGE UP (ref 150–400)
RBC # BLD: 4.41 M/UL — SIGNIFICANT CHANGE UP (ref 3.8–5.2)
RBC # FLD: 13.8 % — SIGNIFICANT CHANGE UP (ref 10.3–14.5)
WBC # BLD: 6.5 K/UL — SIGNIFICANT CHANGE UP (ref 3.8–10.5)
WBC # FLD AUTO: 6.5 K/UL — SIGNIFICANT CHANGE UP (ref 3.8–10.5)

## 2023-04-14 PROCEDURE — 99214 OFFICE O/P EST MOD 30 MIN: CPT

## 2023-04-15 LAB
25(OH)D3 SERPL-MCNC: 53.6 NG/ML
ALBUMIN SERPL ELPH-MCNC: 4.5 G/DL
ALP BLD-CCNC: 97 U/L
ALT SERPL-CCNC: 11 U/L
ANION GAP SERPL CALC-SCNC: 13 MMOL/L
AST SERPL-CCNC: 14 U/L
BILIRUB SERPL-MCNC: 0.3 MG/DL
BUN SERPL-MCNC: 14 MG/DL
CALCIUM SERPL-MCNC: 10.5 MG/DL
CHLORIDE SERPL-SCNC: 105 MMOL/L
CO2 SERPL-SCNC: 24 MMOL/L
CREAT SERPL-MCNC: 0.71 MG/DL
EGFR: 97 ML/MIN/1.73M2
GLUCOSE SERPL-MCNC: 88 MG/DL
POTASSIUM SERPL-SCNC: 4.6 MMOL/L
PROT SERPL-MCNC: 6.5 G/DL
SODIUM SERPL-SCNC: 143 MMOL/L

## 2023-04-15 NOTE — HISTORY OF PRESENT ILLNESS
[Disease: _____________________] : Disease: [unfilled] [de-identified] : 62F, postmenopausal, of [place] origin, with ER+, Her 2 negative, stage II A ( T1c, N1a)  infiltrating ductal carcinoma diagnosed in December 2014.\par \par CASE SYNOPSIS:\par 8/2014 – mammogram at age 54 –abnormality in left breast UOQ, previously biopsied. \par \par 11/5/14 – core biopsy confirms malignancy .\par \par 12/10/14 – left lumpectomy; pathology 1.8 x 1.6 x 1.0 cm infiltrating ductal carcinoma with focal DCIS, LVI+, 1/1 SLN positive with 2.5 mm micrometastasis.\par \par 2/10/15 – started Tamoxifen (patient premenopausal at the time). \par \par 4/13/15 – completed XRT.\par \par 7/20/18 – mammogram/ sonogram: no evidence of disease.\par \par 3/6/19- mammogram/ breast US: no mammographic evidence of malignancy; stable hypoechoic nodule 1:00 right breast (previously biopsied).\par \par 6/11/19- D&C- endometrial  hyperplasia; + endometrial polyps x 3; no malignancy.\par \par 1/24/20- D& C; fragments of endometrial polyp in a background of weakly proliferative endometrium.\par \par 3/1/20- Plan to discontinue tamoxifen and switch to letrozole 2.5 mg daily.\par \par 3/5/2021: Mammogram: Marked postlumpectomy changes in the left upper outer quadrant.  The lumpectomy site associated with additional questionable calcification on spot magnification radiographs were several circumferential new coarse calcifications are noted. Spot magnification radiographs are obtained of calcifications in the central inner left breast which are predominantly curvilinear.\par \par 3/5/2021: Breast ultrasound: Probable benign calcifications at the lumpectomy site as well as in the left central inner breast, based on fat necrosis.  Follow-up diagnostic left mammogram in 6-month recommended.\par \par 9/10/2021: Mammogram left breast–stable, benign-appearing calcification in the left breast.  Annual follow-up recommended.\par \par 10/14/2021: DEXA scan ([facility]): Osteoporosis spine, high risk fracture.  \par 3/14/2022 Mammogram/bilateral breast ultrasound–no mammographic or sonographic evidence of malignancy\par \par 3/31/2023 bilateral breast mammogram/US–no evidence of malignancy [de-identified] : infiltrating ductal carcinoma [FreeTextEntry1] : Tamoxifen-  discontinued 2/29/20\par Letrozole 2.5 mg daily- started March 1, 2020. [de-identified] : Compliant with antiestrogen therapy since February 2015 (8 years, initially on tamoxifen, for the past 3 years on letrozole).\par Appears nontoxic.  Recent evaluated by cardiology for dyspnea; had stress test and echocardiogram–reportedly stable.\par Denies new constitutional symptoms.  Last mammogram/breast US performed 3/31/2023 showed no evidence of recurrent disease.\par Reports no side effects from AI.  Denies recent hospitalization.  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  Patient has completed dental work and is now considering antiresorptive bone therapy.  Dental to clear.\par

## 2023-04-15 NOTE — PHYSICAL EXAM
[Fully active, able to carry on all pre-disease performance without restriction] : Status 0 - Fully active, able to carry on all pre-disease performance without restriction [Normal] : full range of motion and no deformities appreciated [de-identified] : Subcutaneous cysts left abdominal flank and under the left breast [de-identified] : left breast lumpectomy scar well healed. No masses, no nipple d/c bilaterally.

## 2023-04-20 ENCOUNTER — NON-APPOINTMENT (OUTPATIENT)
Age: 63
End: 2023-04-20

## 2023-04-28 ENCOUNTER — APPOINTMENT (OUTPATIENT)
Dept: CARDIOLOGY | Facility: CLINIC | Age: 63
End: 2023-04-28
Payer: COMMERCIAL

## 2023-04-28 PROCEDURE — 93880 EXTRACRANIAL BILAT STUDY: CPT

## 2023-04-28 PROCEDURE — 93306 TTE W/DOPPLER COMPLETE: CPT

## 2023-05-08 ENCOUNTER — NON-APPOINTMENT (OUTPATIENT)
Age: 63
End: 2023-05-08

## 2023-05-08 ENCOUNTER — APPOINTMENT (OUTPATIENT)
Dept: CARDIOLOGY | Facility: CLINIC | Age: 63
End: 2023-05-08
Payer: COMMERCIAL

## 2023-05-08 VITALS
HEART RATE: 65 BPM | RESPIRATION RATE: 16 BRPM | DIASTOLIC BLOOD PRESSURE: 80 MMHG | BODY MASS INDEX: 32.57 KG/M2 | WEIGHT: 177 LBS | HEIGHT: 62 IN | SYSTOLIC BLOOD PRESSURE: 126 MMHG

## 2023-05-08 PROCEDURE — 93000 ELECTROCARDIOGRAM COMPLETE: CPT

## 2023-05-08 PROCEDURE — 99214 OFFICE O/P EST MOD 30 MIN: CPT | Mod: 25

## 2023-05-08 NOTE — ASSESSMENT
[FreeTextEntry1] : EKG shows normal sinus rhythm at a rate of 65; Poor R-wave progression across precordial leads; left axis deviation; nonspecific T wave abnormality; General low-voltage tracing;\par \par In summary, this 62-year-old woman has a history for mild plaquing CAD diagnosed a cardiac catheterization August 30, 2021. She has been largely asymptomatic from a cardiac standpoint;  \par \par Also has history for side effects from Atorvastatin 10 mg with leg cramping / discomfort - not current on anti-lipid therapy.  Most recent fasting labs demonstrates LDL (125);\par Patient admits to being non-compliant with adhering to low fat diet and exercising regularly.\par \par Had recent lipid panel with primary care and awaiting to review those results as well.;\par \par \par Plan:\par \par Discussed with patient the likely need to start a low-dose statin that may agree with her in the future with LDL goals well below 100 if possible;\par \par Patient recommended to continue with valsartan 160 mg p.o. daily;\par \par Continue other medications the same;\par \par Recommend her regular checkups and laboratory blood tests and lipid panel with primary care;\par \par Follow-up to office within 4 months or as needed;\par \par Discussed repeating cardiac stress test in the future with nuclear imaging but not urgent at this time;

## 2023-05-08 NOTE — REASON FOR VISIT
[FreeTextEntry1] : EVERETT TOVAR is being seen for arrhythmia/ECG abnormalities, structural heart and valve disease, hyperlipidemia and hypertension. \par \par The patient is a 62-year-old white female who has a history for mild/subcritical CAD diagnosed a cardiac catheterization August 30, 2021.(30% plaque in proximal LAD);\par \par She has been managed for history of mild hypertension and hyperlipidemia, unfortunately she has been intolerant to Atorvastatin in the past and is currently not on anti-lipid medication;\par \par The patient presents back to the office today for general cardiac checkup, and to discuss results of recent cardiovascular testing;\par \par Overall, patient reports that she has been generally feeling well and has had no significant chest pain, shortness of breath, palpitations or dizziness;

## 2023-05-08 NOTE — PHYSICAL EXAM
[No Acute Distress] : no acute distress [Obese] : obese [Normal Conjunctiva] : normal conjunctiva [Normal Venous Pressure] : normal venous pressure [Normal S1, S2] : normal S1, S2 [No Murmur] : no murmur [No Rub] : no rub [No Gallop] : no gallop [Clear Lung Fields] : clear lung fields [Good Air Entry] : good air entry [No Respiratory Distress] : no respiratory distress  [Soft] : abdomen soft [Non Tender] : non-tender [No Masses/organomegaly] : no masses/organomegaly [Normal Gait] : normal gait [No Edema] : no edema [No Cyanosis] : no cyanosis [No Clubbing] : no clubbing [No Rash] : no rash [No Skin Lesions] : no skin lesions [Moves all extremities] : moves all extremities [No Focal Deficits] : no focal deficits [Normal Speech] : normal speech [Alert and Oriented] : alert and oriented [Normal memory] : normal memory [Appears Anxious] : appears anxious

## 2023-05-08 NOTE — HISTORY OF PRESENT ILLNESS
[FreeTextEntry1] : Patient is tolerating valsartan after being changed from lisinopril after having a "ACE inhibitor cough";\par \par \par She has not been able to exercise much and her diet has been less than ideal with taking care of her mother lately, who recently caused a fracture to her wrist banging it against a wall;\par \par Carotid duplex study performed April 28 2023 demonstrated no significant obstructive disease with normal flow bilaterally;\par \par Transthoracic echo from April 28, 2023 demonstrated preserved left ventricular size and systolic function with normal ejection fraction of 60%.  There was mild dilatation of the ascending aorta (new); trace MR and PI with mild AI.  No pericardial effusion; mild aortic sclerosis;

## 2023-08-23 ENCOUNTER — APPOINTMENT (OUTPATIENT)
Dept: BREAST CENTER | Facility: CLINIC | Age: 63
End: 2023-08-23

## 2023-08-29 ENCOUNTER — APPOINTMENT (OUTPATIENT)
Dept: BREAST CENTER | Facility: CLINIC | Age: 63
End: 2023-08-29
Payer: COMMERCIAL

## 2023-08-29 VITALS
WEIGHT: 180 LBS | DIASTOLIC BLOOD PRESSURE: 83 MMHG | BODY MASS INDEX: 33.13 KG/M2 | HEART RATE: 66 BPM | SYSTOLIC BLOOD PRESSURE: 147 MMHG | HEIGHT: 62 IN

## 2023-08-29 DIAGNOSIS — Z85.3 ENCOUNTER FOR FOLLOW-UP EXAMINATION AFTER COMPLETED TREATMENT FOR MALIGNANT NEOPLASM: ICD-10-CM

## 2023-08-29 DIAGNOSIS — Z08 ENCOUNTER FOR FOLLOW-UP EXAMINATION AFTER COMPLETED TREATMENT FOR MALIGNANT NEOPLASM: ICD-10-CM

## 2023-08-29 PROCEDURE — 99212 OFFICE O/P EST SF 10 MIN: CPT

## 2023-08-29 NOTE — DATA REVIEWED
[FreeTextEntry1] : 3/31/2023 Mammogram screening : scattered areas of fibroglandular density.  No mammographic evidence of malignancy

## 2023-08-29 NOTE — HISTORY OF PRESENT ILLNESS
[FreeTextEntry1] : Ms. Simpson is a 63 year old woman here for a follow-up for surveillance for breast cancer as part of survivorship. Her breast history is significant for  1.) Left breast infiltrating ductal carcinoma diagnosed in 2014 at age 54, pathologic stage II, pT1 pN1, ER 85%, OR 95%, Her2 0-1, SBR 5-6/9 - s/p L lumpectomy, sentinel node biopsy (Dr. Alexander, 12/10/14) = 1.8 cm tumor, 1/1 SLN with 0.25 cm metastasis - s/p radiation (Dr. Taylor) - on tamoxifen then letrozole (Dr. Schneider)  She is doing well without complaints of breast lumps, skin changes or nipple discharge.   Her genetic panel testing is negative. Her family history is significant for breast cancer in her mother.

## 2023-08-29 NOTE — PHYSICAL EXAM
[Normocephalic] : normocephalic [Atraumatic] : atraumatic [Sclera nonicteric] : sclera nonicteric [Conjunctiva pink] : conjunctiva pink [No Supraclavicular Adenopathy] : no supraclavicular adenopathy [No Cervical Adenopathy] : no cervical adenopathy [No Caroid Bruits] : no carotid bruits [Clear to Auscultation Bilat] : clear to auscultation bilaterally [Normal S1, S2] : normal S1 and S2 [Examined in the supine and seated position] : examined in the supine and seated position [No dominant masses] : no dominant masses in right breast  [No dominant masses] : no dominant masses left breast [No Nipple Retraction] : no left nipple retraction [No Nipple Discharge] : no left nipple discharge [No Axillary Lymphadenopathy] : no left axillary lymphadenopathy [No Edema] : no edema [No Ulceration] : no ulceration [de-identified] : transverse scar superiorly (lumpectomy)  [de-identified] : transverse scar (SLN biopsy) [de-identified] : right breast papular rash noted superior to NAC - patient was unaware of this

## 2023-09-16 ENCOUNTER — RX RENEWAL (OUTPATIENT)
Age: 63
End: 2023-09-16

## 2023-10-03 ENCOUNTER — APPOINTMENT (OUTPATIENT)
Dept: CARDIOLOGY | Facility: CLINIC | Age: 63
End: 2023-10-03
Payer: COMMERCIAL

## 2023-10-03 ENCOUNTER — NON-APPOINTMENT (OUTPATIENT)
Age: 63
End: 2023-10-03

## 2023-10-03 VITALS
HEIGHT: 62 IN | RESPIRATION RATE: 16 BRPM | DIASTOLIC BLOOD PRESSURE: 78 MMHG | SYSTOLIC BLOOD PRESSURE: 120 MMHG | BODY MASS INDEX: 32.76 KG/M2 | HEART RATE: 64 BPM | WEIGHT: 178 LBS

## 2023-10-03 DIAGNOSIS — R94.31 ABNORMAL ELECTROCARDIOGRAM [ECG] [EKG]: ICD-10-CM

## 2023-10-03 DIAGNOSIS — R07.89 OTHER CHEST PAIN: ICD-10-CM

## 2023-10-03 PROCEDURE — 99214 OFFICE O/P EST MOD 30 MIN: CPT | Mod: 25

## 2023-10-03 PROCEDURE — 93000 ELECTROCARDIOGRAM COMPLETE: CPT

## 2023-10-25 ENCOUNTER — OUTPATIENT (OUTPATIENT)
Dept: OUTPATIENT SERVICES | Facility: HOSPITAL | Age: 63
LOS: 1 days | Discharge: HOME | End: 2023-10-25

## 2023-10-25 DIAGNOSIS — C50.912 MALIGNANT NEOPLASM OF UNSPECIFIED SITE OF LEFT FEMALE BREAST: ICD-10-CM

## 2023-10-27 ENCOUNTER — APPOINTMENT (OUTPATIENT)
Dept: HEMATOLOGY ONCOLOGY | Facility: CLINIC | Age: 63
End: 2023-10-27

## 2023-11-17 ENCOUNTER — APPOINTMENT (OUTPATIENT)
Dept: HEMATOLOGY ONCOLOGY | Facility: CLINIC | Age: 63
End: 2023-11-17
Payer: COMMERCIAL

## 2023-11-17 ENCOUNTER — RESULT REVIEW (OUTPATIENT)
Age: 63
End: 2023-11-17

## 2023-11-17 VITALS
RESPIRATION RATE: 16 BRPM | HEART RATE: 70 BPM | WEIGHT: 184 LBS | TEMPERATURE: 97.3 F | DIASTOLIC BLOOD PRESSURE: 84 MMHG | BODY MASS INDEX: 33.65 KG/M2 | OXYGEN SATURATION: 97 % | SYSTOLIC BLOOD PRESSURE: 129 MMHG

## 2023-11-17 DIAGNOSIS — M81.0 AGE-RELATED OSTEOPOROSIS W/OUT CURRENT PATHOLOGICAL FRACTURE: ICD-10-CM

## 2023-11-17 DIAGNOSIS — C50.912 MALIGNANT NEOPLASM OF UNSPECIFIED SITE OF LEFT FEMALE BREAST: ICD-10-CM

## 2023-11-17 LAB
BASOPHILS # BLD AUTO: 0.02 K/UL — SIGNIFICANT CHANGE UP (ref 0–0.2)
BASOPHILS # BLD AUTO: 0.02 K/UL — SIGNIFICANT CHANGE UP (ref 0–0.2)
BASOPHILS NFR BLD AUTO: 0.3 % — SIGNIFICANT CHANGE UP (ref 0–2)
BASOPHILS NFR BLD AUTO: 0.3 % — SIGNIFICANT CHANGE UP (ref 0–2)
EOSINOPHIL # BLD AUTO: 0.17 K/UL — SIGNIFICANT CHANGE UP (ref 0–0.5)
EOSINOPHIL # BLD AUTO: 0.17 K/UL — SIGNIFICANT CHANGE UP (ref 0–0.5)
EOSINOPHIL NFR BLD AUTO: 2.7 % — SIGNIFICANT CHANGE UP (ref 0–6)
EOSINOPHIL NFR BLD AUTO: 2.7 % — SIGNIFICANT CHANGE UP (ref 0–6)
HCT VFR BLD CALC: 39.2 % — SIGNIFICANT CHANGE UP (ref 34.5–45)
HCT VFR BLD CALC: 39.2 % — SIGNIFICANT CHANGE UP (ref 34.5–45)
HGB BLD-MCNC: 12.8 G/DL — SIGNIFICANT CHANGE UP (ref 11.5–15.5)
HGB BLD-MCNC: 12.8 G/DL — SIGNIFICANT CHANGE UP (ref 11.5–15.5)
IMM GRANULOCYTES NFR BLD AUTO: 0.3 % — SIGNIFICANT CHANGE UP (ref 0–0.9)
IMM GRANULOCYTES NFR BLD AUTO: 0.3 % — SIGNIFICANT CHANGE UP (ref 0–0.9)
LYMPHOCYTES # BLD AUTO: 1.46 K/UL — SIGNIFICANT CHANGE UP (ref 1–3.3)
LYMPHOCYTES # BLD AUTO: 1.46 K/UL — SIGNIFICANT CHANGE UP (ref 1–3.3)
LYMPHOCYTES # BLD AUTO: 23.3 % — SIGNIFICANT CHANGE UP (ref 13–44)
LYMPHOCYTES # BLD AUTO: 23.3 % — SIGNIFICANT CHANGE UP (ref 13–44)
MCHC RBC-ENTMCNC: 28.3 PG — SIGNIFICANT CHANGE UP (ref 27–34)
MCHC RBC-ENTMCNC: 28.3 PG — SIGNIFICANT CHANGE UP (ref 27–34)
MCHC RBC-ENTMCNC: 32.7 G/DL — SIGNIFICANT CHANGE UP (ref 32–36)
MCHC RBC-ENTMCNC: 32.7 G/DL — SIGNIFICANT CHANGE UP (ref 32–36)
MCV RBC AUTO: 86.5 FL — SIGNIFICANT CHANGE UP (ref 80–100)
MCV RBC AUTO: 86.5 FL — SIGNIFICANT CHANGE UP (ref 80–100)
MONOCYTES # BLD AUTO: 0.44 K/UL — SIGNIFICANT CHANGE UP (ref 0–0.9)
MONOCYTES # BLD AUTO: 0.44 K/UL — SIGNIFICANT CHANGE UP (ref 0–0.9)
MONOCYTES NFR BLD AUTO: 7 % — SIGNIFICANT CHANGE UP (ref 2–14)
MONOCYTES NFR BLD AUTO: 7 % — SIGNIFICANT CHANGE UP (ref 2–14)
NEUTROPHILS # BLD AUTO: 4.16 K/UL — SIGNIFICANT CHANGE UP (ref 1.8–7.4)
NEUTROPHILS # BLD AUTO: 4.16 K/UL — SIGNIFICANT CHANGE UP (ref 1.8–7.4)
NEUTROPHILS NFR BLD AUTO: 66.4 % — SIGNIFICANT CHANGE UP (ref 43–77)
NEUTROPHILS NFR BLD AUTO: 66.4 % — SIGNIFICANT CHANGE UP (ref 43–77)
NRBC # BLD: 0 /100 WBCS — SIGNIFICANT CHANGE UP (ref 0–0)
NRBC # BLD: 0 /100 WBCS — SIGNIFICANT CHANGE UP (ref 0–0)
PLATELET # BLD AUTO: 227 K/UL — SIGNIFICANT CHANGE UP (ref 150–400)
PLATELET # BLD AUTO: 227 K/UL — SIGNIFICANT CHANGE UP (ref 150–400)
RBC # BLD: 4.53 M/UL — SIGNIFICANT CHANGE UP (ref 3.8–5.2)
RBC # BLD: 4.53 M/UL — SIGNIFICANT CHANGE UP (ref 3.8–5.2)
RBC # FLD: 14.2 % — SIGNIFICANT CHANGE UP (ref 10.3–14.5)
RBC # FLD: 14.2 % — SIGNIFICANT CHANGE UP (ref 10.3–14.5)
WBC # BLD: 6.27 K/UL — SIGNIFICANT CHANGE UP (ref 3.8–10.5)
WBC # BLD: 6.27 K/UL — SIGNIFICANT CHANGE UP (ref 3.8–10.5)
WBC # FLD AUTO: 6.27 K/UL — SIGNIFICANT CHANGE UP (ref 3.8–10.5)
WBC # FLD AUTO: 6.27 K/UL — SIGNIFICANT CHANGE UP (ref 3.8–10.5)

## 2023-11-17 PROCEDURE — 99214 OFFICE O/P EST MOD 30 MIN: CPT

## 2023-11-19 PROBLEM — M81.0 OSTEOPOROSIS: Status: ACTIVE | Noted: 2023-04-15

## 2023-11-19 LAB
25(OH)D3 SERPL-MCNC: 41.9 NG/ML
ALBUMIN SERPL ELPH-MCNC: 4.6 G/DL
ALP BLD-CCNC: 107 U/L
ALT SERPL-CCNC: 9 U/L
ANION GAP SERPL CALC-SCNC: 10 MMOL/L
AST SERPL-CCNC: 15 U/L
BILIRUB SERPL-MCNC: 0.2 MG/DL
BUN SERPL-MCNC: 15 MG/DL
CALCIUM SERPL-MCNC: 10.2 MG/DL
CHLORIDE SERPL-SCNC: 105 MMOL/L
CO2 SERPL-SCNC: 25 MMOL/L
CREAT SERPL-MCNC: 0.63 MG/DL
EGFR: 101 ML/MIN/1.73M2
GLUCOSE SERPL-MCNC: 118 MG/DL
POTASSIUM SERPL-SCNC: 4.2 MMOL/L
PROT SERPL-MCNC: 7 G/DL
SODIUM SERPL-SCNC: 140 MMOL/L

## 2024-03-11 ENCOUNTER — APPOINTMENT (OUTPATIENT)
Dept: CARDIOLOGY | Facility: CLINIC | Age: 64
End: 2024-03-11
Payer: COMMERCIAL

## 2024-03-11 ENCOUNTER — NON-APPOINTMENT (OUTPATIENT)
Age: 64
End: 2024-03-11

## 2024-03-11 VITALS
BODY MASS INDEX: 34.6 KG/M2 | HEIGHT: 62 IN | RESPIRATION RATE: 16 BRPM | SYSTOLIC BLOOD PRESSURE: 140 MMHG | WEIGHT: 188 LBS | DIASTOLIC BLOOD PRESSURE: 88 MMHG

## 2024-03-11 DIAGNOSIS — I25.10 ATHEROSCLEROTIC HEART DISEASE OF NATIVE CORONARY ARTERY W/OUT ANGINA PECTORIS: ICD-10-CM

## 2024-03-11 DIAGNOSIS — I10 ESSENTIAL (PRIMARY) HYPERTENSION: ICD-10-CM

## 2024-03-11 DIAGNOSIS — E78.5 HYPERLIPIDEMIA, UNSPECIFIED: ICD-10-CM

## 2024-03-11 DIAGNOSIS — I77.810 THORACIC AORTIC ECTASIA: ICD-10-CM

## 2024-03-11 PROCEDURE — 93000 ELECTROCARDIOGRAM COMPLETE: CPT

## 2024-03-11 PROCEDURE — 99214 OFFICE O/P EST MOD 30 MIN: CPT

## 2024-03-11 PROCEDURE — G2211 COMPLEX E/M VISIT ADD ON: CPT | Mod: NC,1L

## 2024-03-11 NOTE — REASON FOR VISIT
[FreeTextEntry1] : EVERETT TOVAR is being seen for arrhythmia/ECG abnormalities, structural heart and valve disease, hyperlipidemia and hypertension.   The patient is a 63-year-old white female who has a history for mild/subcritical CAD diagnosed a cardiac catheterization August 30, 2021.(30% plaque in proximal LAD);  She has been managed for history of mild hypertension and hyperlipidemia, unfortunately she has been intolerant to Atorvastatin in the past and is currently not on anti-lipid medication;  The patient presents back to the office today for general cardiac checkup;  Overall, patient reports that she has been generally feeling well and has had no significant chest pain, shortness of breath, palpitations or dizziness;

## 2024-03-11 NOTE — HISTORY OF PRESENT ILLNESS
[FreeTextEntry1] : Patient is tolerating valsartan after being changed from lisinopril after having a "ACE inhibitor cough";  Of concern however is her having difficulty losing any meaningful weight and she readily admits that she eats carbs/bread etc.; she has been less physically active over the winter as well;  Recent lipid panel from March 7, 2024 demonstrates total cholesterol equals 192, HDL equals 45, triglycerides equal 195, LDL equals 116;   Carotid duplex study performed April 28 2023 demonstrated no significant obstructive disease with normal flow bilaterally;  Transthoracic echo from April 28, 2023 demonstrated preserved left ventricular size and systolic function with normal ejection fraction of 60%.  There was mild dilatation of the ascending aorta (new); trace MR and PI with mild AI.  No pericardial effusion; mild aortic sclerosis;

## 2024-03-11 NOTE — ASSESSMENT
[FreeTextEntry1] : EKG shows normal sinus rhythm at a rate of 57; Poor R-wave progression across precordial leads; left axis deviation; nonspecific T wave abnormality; General low-voltage tracing; essentially unchanged;  In summary, this 63-year-old woman has a history for mild plaquing CAD in proximal LAD vessel- diagnosed at cardiac catheterization August 30, 2021. She has been largely asymptomatic from a cardiac standpoint;    ()Also has history for side effects from Atorvastatin 10 mg with leg cramping / discomfort - not current on anti-lipid therapy.)  Most recent lipid profile shows elevated triglycerides at 195 and LDL cholesterol borderline at 116 with total cholesterol at 192;  Patient admits to being non-compliant with adhering to low fat diet and exercising regularly.  Otherwise, appears cardiac and hemodynamically stable;   Plan:  Recommend arranging nuclear stress test for later in the summer to rule out any progressive CAD or ischemia.;  Will recommend omega-3 fish oil capsules for hypertriglyceridemia and consider a low-dose other statin in the future if she would tolerate that;  Patient recommended to continue with valsartan 160 mg p.o. daily;  Recommend her regular checkups and laboratory blood tests and lipid panel with primary care;  Follow-up to office within 5 months or as needed;

## 2024-03-27 ENCOUNTER — RX RENEWAL (OUTPATIENT)
Age: 64
End: 2024-03-27

## 2024-03-27 RX ORDER — LETROZOLE TABLETS 2.5 MG/1
2.5 TABLET, FILM COATED ORAL
Qty: 90 | Refills: 1 | Status: ACTIVE | COMMUNITY
Start: 2020-02-21 | End: 1900-01-01

## 2024-04-12 RX ORDER — VALSARTAN 160 MG/1
160 TABLET, COATED ORAL
Qty: 90 | Refills: 3 | Status: ACTIVE | COMMUNITY
Start: 2022-04-20 | End: 1900-01-01

## 2024-05-08 ENCOUNTER — NON-APPOINTMENT (OUTPATIENT)
Age: 64
End: 2024-05-08

## 2024-05-10 ENCOUNTER — OUTPATIENT (OUTPATIENT)
Dept: OUTPATIENT SERVICES | Facility: HOSPITAL | Age: 64
LOS: 1 days | End: 2024-05-10
Payer: COMMERCIAL

## 2024-05-10 ENCOUNTER — RESULT REVIEW (OUTPATIENT)
Age: 64
End: 2024-05-10

## 2024-05-10 ENCOUNTER — APPOINTMENT (OUTPATIENT)
Dept: MAMMOGRAPHY | Facility: CLINIC | Age: 64
End: 2024-05-10
Payer: COMMERCIAL

## 2024-05-10 DIAGNOSIS — Z85.3 PERSONAL HISTORY OF MALIGNANT NEOPLASM OF BREAST: ICD-10-CM

## 2024-05-10 DIAGNOSIS — Z00.00 ENCOUNTER FOR GENERAL ADULT MEDICAL EXAMINATION WITHOUT ABNORMAL FINDINGS: ICD-10-CM

## 2024-05-10 DIAGNOSIS — Z00.8 ENCOUNTER FOR OTHER GENERAL EXAMINATION: ICD-10-CM

## 2024-05-10 PROCEDURE — 77063 BREAST TOMOSYNTHESIS BI: CPT | Mod: 26

## 2024-05-10 PROCEDURE — 77067 SCR MAMMO BI INCL CAD: CPT | Mod: 26

## 2024-05-10 PROCEDURE — 77063 BREAST TOMOSYNTHESIS BI: CPT

## 2024-05-10 PROCEDURE — 77067 SCR MAMMO BI INCL CAD: CPT

## 2024-05-13 ENCOUNTER — NON-APPOINTMENT (OUTPATIENT)
Age: 64
End: 2024-05-13

## 2024-08-02 ENCOUNTER — APPOINTMENT (OUTPATIENT)
Dept: CARDIOLOGY | Facility: CLINIC | Age: 64
End: 2024-08-02
Payer: COMMERCIAL

## 2024-08-02 PROCEDURE — 93015 CV STRESS TEST SUPVJ I&R: CPT

## 2024-08-02 PROCEDURE — 78452 HT MUSCLE IMAGE SPECT MULT: CPT

## 2024-08-02 PROCEDURE — A9500: CPT

## 2024-08-09 ENCOUNTER — OUTPATIENT (OUTPATIENT)
Dept: OUTPATIENT SERVICES | Facility: HOSPITAL | Age: 64
LOS: 1 days | Discharge: HOME | End: 2024-08-09

## 2024-08-09 DIAGNOSIS — C50.912 MALIGNANT NEOPLASM OF UNSPECIFIED SITE OF LEFT FEMALE BREAST: ICD-10-CM

## 2024-08-16 ENCOUNTER — APPOINTMENT (OUTPATIENT)
Dept: HEMATOLOGY ONCOLOGY | Facility: CLINIC | Age: 64
End: 2024-08-16
Payer: COMMERCIAL

## 2024-08-16 DIAGNOSIS — C50.912 MALIGNANT NEOPLASM OF UNSPECIFIED SITE OF LEFT FEMALE BREAST: ICD-10-CM

## 2024-08-16 PROCEDURE — 99214 OFFICE O/P EST MOD 30 MIN: CPT

## 2024-08-17 NOTE — PHYSICAL EXAM
[Fully active, able to carry on all pre-disease performance without restriction] : Status 0 - Fully active, able to carry on all pre-disease performance without restriction [Normal] : full range of motion and no deformities appreciated [de-identified] : left breast lumpectomy scar well healed. No masses, no nipple d/c bilaterally. [de-identified] : Subcutaneous cysts left abdominal flank and under the left breast

## 2024-08-17 NOTE — RESULTS/DATA
[FreeTextEntry1] : I reviewed recent imaging and today's blood work results with patient.\par  \par

## 2024-08-17 NOTE — HISTORY OF PRESENT ILLNESS
[Disease: _____________________] : Disease: [unfilled] [de-identified] : 63F, postmenopausal, of [place] origin, with ER+, Her 2 negative, stage II A ( T1c, N1a)  infiltrating ductal carcinoma diagnosed in December 2014.  CASE SYNOPSIS: 8/2014 - mammogram at age 54 -abnormality in left breast UOQ, previously biopsied.   11/5/14 - core biopsy confirms malignancy .  12/10/14 - left lumpectomy; pathology 1.8 x 1.6 x 1.0 cm infiltrating ductal carcinoma with focal DCIS, LVI+, 1/1 SLN positive with 2.5 mm micrometastasis.  2/10/15 - started Tamoxifen (patient premenopausal at the time).   4/13/15 - completed XRT.  7/20/18 - mammogram/ sonogram: no evidence of disease.  3/6/19- mammogram/ breast US: no mammographic evidence of malignancy; stable hypoechoic nodule 1:00 right breast (previously biopsied).  6/11/19- D&C- endometrial  hyperplasia; + endometrial polyps x 3; no malignancy.  1/24/20- D& C; fragments of endometrial polyp in a background of weakly proliferative endometrium.  3/1/20- discontinued tamoxifen and switch to letrozole 2.5 mg daily.  3/5/2021: Mammogram: Marked postlumpectomy changes in the left upper outer quadrant.  The lumpectomy site associated with additional questionable calcification on spot magnification radiographs were several circumferential new coarse calcifications are noted. Spot magnification radiographs are obtained of calcifications in the central inner left breast which are predominantly curvilinear.  3/5/2021: Breast ultrasound: Probable benign calcifications at the lumpectomy site as well as in the left central inner breast, based on fat necrosis.  Follow-up diagnostic left mammogram in 6-month recommended.  9/10/2021: Mammogram left breast-stable, benign-appearing calcification in the left breast.  Annual follow-up recommended.  10/14/2021: DEXA scan ([facility]): Osteoporosis spine, high risk fracture.    3/14/2022 Mammogram/bilateral breast ultrasound-no mammographic or sonographic evidence of malignancy  3/31/2023 bilateral breast mammogram/US-no evidence of malignancy.  5/10/2024 bilateral breast mammo-no evidence of malignancy  5/28/2024 transvaginal US: Endometrial thickness appears stable from previous sonogram measuring 4.24 cm [de-identified] : infiltrating ductal carcinoma [FreeTextEntry1] : Tamoxifen-  discontinued 2/29/20\par  Letrozole 2.5 mg daily- started March 1, 2020. [de-identified] : Ms. TOVAR  was last seen in the office in November 2023.  During the past few month patient has been stable clinically, reporting no new constitutional symptoms.  On 5/10/2024 she had manual mammogram/breast US-no evidence of malignancy.  On 5/28/2024 patient underwent a transvaginal US which showed endometrial thickness appears stable from previous sonogram measuring 4.24 cm. Continues letrozole with minimal side effects.  Approaching a decade on adjuvant anti-estrogen therapy in February 2025.  Hematologic profile stable.

## 2024-08-17 NOTE — ASSESSMENT
[FreeTextEntry1] : Ms. TOVAR 's questions were answered to her satisfaction.  She  expressed her  understanding and willingness to comply with the above recommendations, and  will return to the office in 6 months.

## 2024-08-17 NOTE — PHYSICAL EXAM
[Fully active, able to carry on all pre-disease performance without restriction] : Status 0 - Fully active, able to carry on all pre-disease performance without restriction [Normal] : full range of motion and no deformities appreciated [de-identified] : left breast lumpectomy scar well healed. No masses, no nipple d/c bilaterally. [de-identified] : Subcutaneous cysts left abdominal flank and under the left breast

## 2024-08-17 NOTE — HISTORY OF PRESENT ILLNESS
[Disease: _____________________] : Disease: [unfilled] [de-identified] : 63F, postmenopausal, of [place] origin, with ER+, Her 2 negative, stage II A ( T1c, N1a)  infiltrating ductal carcinoma diagnosed in December 2014.  CASE SYNOPSIS: 8/2014 - mammogram at age 54 -abnormality in left breast UOQ, previously biopsied.   11/5/14 - core biopsy confirms malignancy .  12/10/14 - left lumpectomy; pathology 1.8 x 1.6 x 1.0 cm infiltrating ductal carcinoma with focal DCIS, LVI+, 1/1 SLN positive with 2.5 mm micrometastasis.  2/10/15 - started Tamoxifen (patient premenopausal at the time).   4/13/15 - completed XRT.  7/20/18 - mammogram/ sonogram: no evidence of disease.  3/6/19- mammogram/ breast US: no mammographic evidence of malignancy; stable hypoechoic nodule 1:00 right breast (previously biopsied).  6/11/19- D&C- endometrial  hyperplasia; + endometrial polyps x 3; no malignancy.  1/24/20- D& C; fragments of endometrial polyp in a background of weakly proliferative endometrium.  3/1/20- discontinued tamoxifen and switch to letrozole 2.5 mg daily.  3/5/2021: Mammogram: Marked postlumpectomy changes in the left upper outer quadrant.  The lumpectomy site associated with additional questionable calcification on spot magnification radiographs were several circumferential new coarse calcifications are noted. Spot magnification radiographs are obtained of calcifications in the central inner left breast which are predominantly curvilinear.  3/5/2021: Breast ultrasound: Probable benign calcifications at the lumpectomy site as well as in the left central inner breast, based on fat necrosis.  Follow-up diagnostic left mammogram in 6-month recommended.  9/10/2021: Mammogram left breast-stable, benign-appearing calcification in the left breast.  Annual follow-up recommended.  10/14/2021: DEXA scan ([facility]): Osteoporosis spine, high risk fracture.    3/14/2022 Mammogram/bilateral breast ultrasound-no mammographic or sonographic evidence of malignancy  3/31/2023 bilateral breast mammogram/US-no evidence of malignancy.  5/10/2024 bilateral breast mammo-no evidence of malignancy  5/28/2024 transvaginal US: Endometrial thickness appears stable from previous sonogram measuring 4.24 cm [de-identified] : infiltrating ductal carcinoma [FreeTextEntry1] : Tamoxifen-  discontinued 2/29/20\par  Letrozole 2.5 mg daily- started March 1, 2020. [de-identified] : Ms. TOVAR  was last seen in the office in November 2023.  During the past few month patient has been stable clinically, reporting no new constitutional symptoms.  On 5/10/2024 she had manual mammogram/breast US-no evidence of malignancy.  On 5/28/2024 patient underwent a transvaginal US which showed endometrial thickness appears stable from previous sonogram measuring 4.24 cm. Continues letrozole with minimal side effects.  Approaching a decade on adjuvant anti-estrogen therapy in February 2025.  Hematologic profile stable.

## 2024-08-30 ENCOUNTER — APPOINTMENT (OUTPATIENT)
Dept: CARDIOLOGY | Facility: CLINIC | Age: 64
End: 2024-08-30
Payer: COMMERCIAL

## 2024-08-30 VITALS
HEART RATE: 65 BPM | OXYGEN SATURATION: 98 % | BODY MASS INDEX: 33.47 KG/M2 | WEIGHT: 183 LBS | RESPIRATION RATE: 13 BRPM | SYSTOLIC BLOOD PRESSURE: 132 MMHG | DIASTOLIC BLOOD PRESSURE: 78 MMHG

## 2024-08-30 DIAGNOSIS — R09.89 OTHER SPECIFIED SYMPTOMS AND SIGNS INVOLVING THE CIRCULATORY AND RESPIRATORY SYSTEMS: ICD-10-CM

## 2024-08-30 DIAGNOSIS — R07.89 OTHER CHEST PAIN: ICD-10-CM

## 2024-08-30 DIAGNOSIS — I25.10 ATHEROSCLEROTIC HEART DISEASE OF NATIVE CORONARY ARTERY W/OUT ANGINA PECTORIS: ICD-10-CM

## 2024-08-30 PROCEDURE — G2211 COMPLEX E/M VISIT ADD ON: CPT | Mod: NC

## 2024-08-30 PROCEDURE — 99214 OFFICE O/P EST MOD 30 MIN: CPT

## 2024-08-30 NOTE — HISTORY OF PRESENT ILLNESS
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 Huffman her primary care and to get those results later today. Since she was intolerant of atorvastatin due to muscle aches and pains, she has not been tried on another statin to date;  Patient is tolerating valsartan after being changed from lisinopril after having a "ACE inhibitor cough";  Previous lipid panel from March 7, 2024 demonstrates total cholesterol equals 192, HDL equals 45, triglycerides equal 195, LDL equals 116;    Exercise myocardial perfusion stress test performed August 2, 2024 shows good exercise tolerance into stage III.  No symptoms of chest pain.  Rare PACs and PVCs were noted.  There was normal myocardial perfusion noted without any evidence of ischemia or defects.  LVEF normal at 71%.  Borderline nonspecific ST segment depressions but not deemed to be significant in light of above findings; Patient remained asymptomatic from the cardiac standpoint;   Carotid duplex study performed April 28 2023 demonstrated no significant obstructive disease with normal flow bilaterally;  Transthoracic echo from April 28, 2023 demonstrated preserved left ventricular size and systolic function with normal ejection fraction of 60%.  There was mild dilatation of the ascending aorta (new); trace MR and PI with mild AI.  No pericardial effusion; mild aortic sclerosis;

## 2024-08-30 NOTE — REASON FOR VISIT
[FreeTextEntry1] : EVERETT TOVAR is being seen for arrhythmia/ECG abnormalities, structural heart and valve disease, hyperlipidemia and hypertension.   The patient is a 64-year-old white female who has a history for mild/subcritical CAD diagnosed a cardiac catheterization August 30, 2021.(30% plaque in proximal LAD);  She has been managed for history of mild hypertension and hyperlipidemia, unfortunately she has been intolerant to Atorvastatin in the past and is currently not on anti-lipid medication;  The patient presents back to the office today for general cardiac checkup and to discuss results of recent nuclear stress test exercise report;  Overall, patient reports that she has been generally feeling well and has had no significant chest pain, shortness of breath, palpitations or dizziness;

## 2024-08-30 NOTE — ASSESSMENT
[FreeTextEntry1] : EKG : None today   In summary, this 64-year-old woman has a history for mild plaquing CAD in proximal LAD vessel- diagnosed at cardiac catheterization August 30, 2021. She has been largely asymptomatic from a cardiac standpoint;    ()Also has history for side effects from Atorvastatin 10 mg with leg cramping / discomfort - not current on anti-lipid therapy.)  Recently completed exercise myocardial perfusion stress test from August 2, 2024 which was negative for ischemia on the myocardial perfusion images with good exercise tolerance;  Prior lipid profile shows elevated triglycerides at 195 and LDL cholesterol borderline at 116 with total cholesterol at 192; (but awaiting repeat lipid panel and results with primary care today)  Patient admits to being non-compliant with adhering to low fat diet and exercising regularly.  Otherwise, appears cardiac and hemodynamically stable;   Plan:  Based on upcoming lipid profile results if LDL is significantly above 100 would consider in this patient empirically starting Crestor 5 mg daily to see if she would tolerate this;  Patient recommended to continue with valsartan 160 mg p.o. daily;  Recommend her regular checkups and laboratory blood tests and lipid panel with primary care;  Follow-up to office within 5-6 months or as needed;

## 2024-09-23 ENCOUNTER — NON-APPOINTMENT (OUTPATIENT)
Age: 64
End: 2024-09-23

## 2024-09-23 ENCOUNTER — RX RENEWAL (OUTPATIENT)
Age: 64
End: 2024-09-23

## 2024-09-24 ENCOUNTER — APPOINTMENT (OUTPATIENT)
Dept: BREAST CENTER | Facility: CLINIC | Age: 64
End: 2024-09-24
Payer: COMMERCIAL

## 2024-09-24 VITALS
BODY MASS INDEX: 33.13 KG/M2 | SYSTOLIC BLOOD PRESSURE: 141 MMHG | HEART RATE: 61 BPM | WEIGHT: 180 LBS | DIASTOLIC BLOOD PRESSURE: 86 MMHG | HEIGHT: 62 IN

## 2024-09-24 DIAGNOSIS — Z08 ENCOUNTER FOR FOLLOW-UP EXAMINATION AFTER COMPLETED TREATMENT FOR MALIGNANT NEOPLASM: ICD-10-CM

## 2024-09-24 DIAGNOSIS — Z85.3 ENCOUNTER FOR FOLLOW-UP EXAMINATION AFTER COMPLETED TREATMENT FOR MALIGNANT NEOPLASM: ICD-10-CM

## 2024-09-24 PROCEDURE — 99213 OFFICE O/P EST LOW 20 MIN: CPT

## 2024-09-24 NOTE — PAST MEDICAL HISTORY
[Menarche Age ____] : age at menarche was [unfilled] [Definite ___ (Date)] : the last menstrual period was [unfilled] [Approximately ___] : the LMP was approximately [unfilled] [Total Preg ___] : G[unfilled] [Live Births ___] : P[unfilled]  [Age At Live Birth ___] : Age at live birth: [unfilled] regular

## 2024-09-24 NOTE — DATA REVIEWED
[FreeTextEntry1] : MAMMO SCREEN W [facility] 05/10/2024:IMPRESSION: No mammographic evidence of malignancy.  RECOMMENDATION:  Mammography in 1 year.  BI-RADS 2 - Benign Finding(s)

## 2024-09-24 NOTE — HISTORY OF PRESENT ILLNESS
[FreeTextEntry1] : Ms. Simpson is a 64 year old woman here for a follow-up for surveillance for breast cancer as part of survivorship. Her breast history is significant for  1.) Left breast infiltrating ductal carcinoma diagnosed in 2014 at age 54, pathologic stage II, pT1 pN1, ER 85%, MO 95%, Her2 0-1, SBR 5-6/9 - s/p L lumpectomy, sentinel node biopsy (Dr. Alexander, 12/10/14) = 1.8 cm tumor, 1/1 SLN with 0.25 cm metastasis - s/p radiation (Dr. Taylor) - on tamoxifen then letrozole (Dr. Schneider) Her genetic panel testing is negative. Her family history is significant for breast cancer in her mother.    She is doing well without complaints of breast lumps, skin changes or nipple discharge.  She notes occasional left breast discomfort.  She denies any change in her health status.

## 2024-09-24 NOTE — PHYSICAL EXAM
[Normocephalic] : normocephalic [Atraumatic] : atraumatic [Sclera nonicteric] : sclera nonicteric [Conjunctiva pink] : conjunctiva pink [No Supraclavicular Adenopathy] : no supraclavicular adenopathy [No Cervical Adenopathy] : no cervical adenopathy [No Caroid Bruits] : no carotid bruits [Clear to Auscultation Bilat] : clear to auscultation bilaterally [Normal S1, S2] : normal S1 and S2 [Examined in the supine and seated position] : examined in the supine and seated position [No dominant masses] : no dominant masses in right breast  [No dominant masses] : no dominant masses left breast [No Nipple Retraction] : no left nipple retraction [No Nipple Discharge] : no left nipple discharge [No Axillary Lymphadenopathy] : no left axillary lymphadenopathy [No Edema] : no edema [No Ulceration] : no ulceration [de-identified] : transverse scar superiorly (lumpectomy)  [de-identified] : transverse scar (SLN biopsy)

## 2025-02-18 ENCOUNTER — NON-APPOINTMENT (OUTPATIENT)
Age: 65
End: 2025-02-18

## 2025-02-18 ENCOUNTER — APPOINTMENT (OUTPATIENT)
Dept: CARDIOLOGY | Facility: CLINIC | Age: 65
End: 2025-02-18
Payer: COMMERCIAL

## 2025-02-18 VITALS
BODY MASS INDEX: 32.57 KG/M2 | WEIGHT: 177 LBS | SYSTOLIC BLOOD PRESSURE: 114 MMHG | DIASTOLIC BLOOD PRESSURE: 74 MMHG | HEIGHT: 62 IN | RESPIRATION RATE: 16 BRPM | HEART RATE: 67 BPM

## 2025-02-18 DIAGNOSIS — R94.31 ABNORMAL ELECTROCARDIOGRAM [ECG] [EKG]: ICD-10-CM

## 2025-02-18 DIAGNOSIS — R07.89 OTHER CHEST PAIN: ICD-10-CM

## 2025-02-18 DIAGNOSIS — E78.5 HYPERLIPIDEMIA, UNSPECIFIED: ICD-10-CM

## 2025-02-18 DIAGNOSIS — I77.810 THORACIC AORTIC ECTASIA: ICD-10-CM

## 2025-02-18 PROCEDURE — 93000 ELECTROCARDIOGRAM COMPLETE: CPT

## 2025-02-18 PROCEDURE — 99214 OFFICE O/P EST MOD 30 MIN: CPT

## 2025-02-18 PROCEDURE — G2211 COMPLEX E/M VISIT ADD ON: CPT | Mod: NC

## 2025-02-19 ENCOUNTER — OUTPATIENT (OUTPATIENT)
Dept: OUTPATIENT SERVICES | Facility: HOSPITAL | Age: 65
LOS: 1 days | Discharge: HOME | End: 2025-02-19

## 2025-02-19 DIAGNOSIS — C50.912 MALIGNANT NEOPLASM OF UNSPECIFIED SITE OF LEFT FEMALE BREAST: ICD-10-CM

## 2025-02-21 ENCOUNTER — APPOINTMENT (OUTPATIENT)
Dept: HEMATOLOGY ONCOLOGY | Facility: CLINIC | Age: 65
End: 2025-02-21
Payer: COMMERCIAL

## 2025-02-21 VITALS
SYSTOLIC BLOOD PRESSURE: 123 MMHG | WEIGHT: 178.35 LBS | TEMPERATURE: 98.1 F | BODY MASS INDEX: 32.62 KG/M2 | OXYGEN SATURATION: 96 % | DIASTOLIC BLOOD PRESSURE: 78 MMHG | HEART RATE: 66 BPM | RESPIRATION RATE: 16 BRPM

## 2025-02-21 DIAGNOSIS — C50.912 MALIGNANT NEOPLASM OF UNSPECIFIED SITE OF LEFT FEMALE BREAST: ICD-10-CM

## 2025-02-21 PROCEDURE — 99214 OFFICE O/P EST MOD 30 MIN: CPT

## 2025-05-19 ENCOUNTER — APPOINTMENT (OUTPATIENT)
Dept: MAMMOGRAPHY | Facility: CLINIC | Age: 65
End: 2025-05-19
Payer: COMMERCIAL

## 2025-05-19 ENCOUNTER — RESULT REVIEW (OUTPATIENT)
Age: 65
End: 2025-05-19

## 2025-05-19 ENCOUNTER — OUTPATIENT (OUTPATIENT)
Dept: OUTPATIENT SERVICES | Facility: HOSPITAL | Age: 65
LOS: 1 days | End: 2025-05-19
Payer: COMMERCIAL

## 2025-05-19 DIAGNOSIS — Z00.8 ENCOUNTER FOR OTHER GENERAL EXAMINATION: ICD-10-CM

## 2025-05-19 DIAGNOSIS — Z00.00 ENCOUNTER FOR GENERAL ADULT MEDICAL EXAMINATION WITHOUT ABNORMAL FINDINGS: ICD-10-CM

## 2025-05-19 PROCEDURE — 77063 BREAST TOMOSYNTHESIS BI: CPT

## 2025-05-19 PROCEDURE — 77063 BREAST TOMOSYNTHESIS BI: CPT | Mod: 26

## 2025-05-19 PROCEDURE — 77067 SCR MAMMO BI INCL CAD: CPT | Mod: 26

## 2025-05-19 PROCEDURE — 77067 SCR MAMMO BI INCL CAD: CPT

## 2025-05-28 ENCOUNTER — NON-APPOINTMENT (OUTPATIENT)
Age: 65
End: 2025-05-28

## 2025-06-26 NOTE — REASON FOR VISIT
[Healthy Appearance] : healthy appearance [No Acute Distress] : no acute distress [Normal Sclera/Conjunctiva] : normal sclera/conjunctiva [No Proptosis] : no proptosis [No Neck Mass] : no neck mass was observed [No LAD] : no lymphadenopathy [Supple] : the neck was supple [Follow-Up Visit] : a follow-up [Thyroid Not Enlarged] : the thyroid was not enlarged [FreeTextEntry2] : breast cancer [No Thyroid Nodules] : no palpable thyroid nodules [No Respiratory Distress] : no respiratory distress [Clear to Auscultation] : lungs were clear to auscultation bilaterally [Normal S1, S2] : normal S1 and S2 [No Murmurs] : no murmurs [Normal Rate] : heart rate was normal [Regular Rhythm] : with a regular rhythm [Normal Affect] : the affect was normal [Normal Mood] : the mood was normal [Acanthosis Nigricans] : no acanthosis nigricans

## 2025-08-18 ENCOUNTER — APPOINTMENT (OUTPATIENT)
Dept: CARDIOLOGY | Facility: CLINIC | Age: 65
End: 2025-08-18
Payer: COMMERCIAL

## 2025-08-18 VITALS
BODY MASS INDEX: 34.23 KG/M2 | HEART RATE: 65 BPM | HEIGHT: 62 IN | DIASTOLIC BLOOD PRESSURE: 82 MMHG | WEIGHT: 186 LBS | SYSTOLIC BLOOD PRESSURE: 126 MMHG

## 2025-08-18 DIAGNOSIS — I77.810 THORACIC AORTIC ECTASIA: ICD-10-CM

## 2025-08-18 DIAGNOSIS — I10 ESSENTIAL (PRIMARY) HYPERTENSION: ICD-10-CM

## 2025-08-18 DIAGNOSIS — R94.31 ABNORMAL ELECTROCARDIOGRAM [ECG] [EKG]: ICD-10-CM

## 2025-08-18 PROCEDURE — 99214 OFFICE O/P EST MOD 30 MIN: CPT | Mod: 25

## 2025-08-18 PROCEDURE — 93000 ELECTROCARDIOGRAM COMPLETE: CPT
